# Patient Record
Sex: MALE | Race: WHITE | URBAN - METROPOLITAN AREA
[De-identification: names, ages, dates, MRNs, and addresses within clinical notes are randomized per-mention and may not be internally consistent; named-entity substitution may affect disease eponyms.]

---

## 2017-05-19 ENCOUNTER — EMERGENCY (EMERGENCY)
Facility: HOSPITAL | Age: 52
LOS: 0 days | Discharge: HOME | End: 2017-05-20
Attending: FAMILY MEDICINE | Admitting: FAMILY MEDICINE
Payer: COMMERCIAL

## 2017-05-19 VITALS
OXYGEN SATURATION: 95 % | DIASTOLIC BLOOD PRESSURE: 89 MMHG | WEIGHT: 190.04 LBS | HEIGHT: 70 IN | TEMPERATURE: 98 F | HEART RATE: 100 BPM | SYSTOLIC BLOOD PRESSURE: 127 MMHG | RESPIRATION RATE: 20 BRPM

## 2017-05-19 DIAGNOSIS — S49.82XA OTHER SPECIFIED INJURIES OF LEFT SHOULDER AND UPPER ARM, INITIAL ENCOUNTER: ICD-10-CM
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
## 2017-05-19 DIAGNOSIS — S44.22XA: ICD-10-CM

## 2017-05-19 DIAGNOSIS — S43.005A UNSPECIFIED DISLOCATION OF LEFT SHOULDER JOINT, INITIAL ENCOUNTER: ICD-10-CM
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
## 2017-05-19 PROCEDURE — 73030 X-RAY EXAM OF SHOULDER: CPT | Mod: 26,LT

## 2017-05-19 RX ORDER — ONDANSETRON 8 MG/1
8 TABLET, FILM COATED ORAL ONCE
Qty: 0 | Refills: 0 | Status: COMPLETED | OUTPATIENT
Start: 2017-05-19 | End: 2017-05-19

## 2017-05-19 RX ORDER — HYDROMORPHONE HYDROCHLORIDE 2 MG/ML
1 INJECTION INTRAMUSCULAR; INTRAVENOUS; SUBCUTANEOUS ONCE
Qty: 0 | Refills: 0 | Status: DISCONTINUED | OUTPATIENT
Start: 2017-05-19 | End: 2017-05-19

## 2017-05-19 RX ORDER — SODIUM CHLORIDE 9 MG/ML
3 INJECTION INTRAMUSCULAR; INTRAVENOUS; SUBCUTANEOUS EVERY 8 HOURS
Qty: 0 | Refills: 0 | Status: DISCONTINUED | OUTPATIENT
Start: 2017-05-19 | End: 2017-05-20

## 2017-05-19 RX ADMIN — ONDANSETRON 8 MILLIGRAM(S): 8 TABLET, FILM COATED ORAL at 23:53

## 2017-05-19 RX ADMIN — HYDROMORPHONE HYDROCHLORIDE 1 MILLIGRAM(S): 2 INJECTION INTRAMUSCULAR; INTRAVENOUS; SUBCUTANEOUS at 23:53

## 2017-05-19 NOTE — ED PROVIDER NOTE - OBJECTIVE STATEMENT
52 y/o male with no PMHx, was playing baseball today, dove for a ball, developed severe pain in left shoulder. He has had left shoulder ligament injuries in the past with surgery. +numbness in left arm and left hand.

## 2017-05-19 NOTE — ED PROVIDER NOTE - MEDICAL DECISION MAKING DETAILS
Pt with hx of rotator cuff repair with pins, with left shoulder dislocation when reaching for a ball during team game.  Pt has numbness and weakness at wrist which did not resolve after reduction by ortho.  Pt to follow up with his own orthopedist. Rest, ice.

## 2017-05-19 NOTE — ED PROVIDER NOTE - NS ED MD SCRIBE ATTENDING SCRIBE SECTIONS
DISPOSITION/REVIEW OF SYSTEMS/PHYSICAL EXAM/PROGRESS NOTE/PAST MEDICAL/SURGICAL/SOCIAL HISTORY/HISTORY OF PRESENT ILLNESS/RESULTS/HIV

## 2017-05-19 NOTE — ED PROVIDER NOTE - CONSTITUTIONAL, MLM
normal... Well appearing, well nourished, awake, alert, oriented to person, place, time/situation, uncomfortable appearing.

## 2017-05-19 NOTE — ED PROVIDER NOTE - CARE PLAN
Principal Discharge DX:	Dislocation of shoulder region, left, initial encounter  Secondary Diagnosis:	Injury of right radial nerve, unspecified injury location, initial encounter

## 2017-05-20 VITALS
RESPIRATION RATE: 18 BRPM | TEMPERATURE: 98 F | SYSTOLIC BLOOD PRESSURE: 122 MMHG | HEART RATE: 88 BPM | DIASTOLIC BLOOD PRESSURE: 81 MMHG | OXYGEN SATURATION: 99 %

## 2017-05-20 PROCEDURE — 99284 EMERGENCY DEPT VISIT MOD MDM: CPT

## 2017-05-20 PROCEDURE — 73030 X-RAY EXAM OF SHOULDER: CPT | Mod: 26,LT

## 2017-05-20 RX ORDER — SODIUM CHLORIDE 9 MG/ML
1000 INJECTION INTRAMUSCULAR; INTRAVENOUS; SUBCUTANEOUS ONCE
Qty: 0 | Refills: 0 | Status: COMPLETED | OUTPATIENT
Start: 2017-05-20 | End: 2017-05-20

## 2017-05-20 RX ORDER — HYDROMORPHONE HYDROCHLORIDE 2 MG/ML
1 INJECTION INTRAMUSCULAR; INTRAVENOUS; SUBCUTANEOUS ONCE
Qty: 0 | Refills: 0 | Status: DISCONTINUED | OUTPATIENT
Start: 2017-05-20 | End: 2017-05-20

## 2017-05-20 RX ADMIN — SODIUM CHLORIDE 1000 MILLILITER(S): 9 INJECTION INTRAMUSCULAR; INTRAVENOUS; SUBCUTANEOUS at 00:05

## 2017-05-20 RX ADMIN — HYDROMORPHONE HYDROCHLORIDE 1 MILLIGRAM(S): 2 INJECTION INTRAMUSCULAR; INTRAVENOUS; SUBCUTANEOUS at 00:04

## 2017-05-20 RX ADMIN — HYDROMORPHONE HYDROCHLORIDE 1 MILLIGRAM(S): 2 INJECTION INTRAMUSCULAR; INTRAVENOUS; SUBCUTANEOUS at 00:15

## 2017-05-20 NOTE — CONSULT NOTE ADULT - ASSESSMENT
51M with L glenohumeral dislocation & resultant radial N neuropraxia  -reduction maneuvers performed, imaging obtained/reviewed, proper alignment achieved  -pain control as per ED  -NWB LUE in sling until f/u with Dr Onofre  -maintain wrist splint for comfort, instructed pt to attempt to move fingers often and remove the brace periodically to attempt motion at the wrist  -residual radial nerve and possibly ulnar nerve neuropraxia noted, informed pt that this usually resolves over time  -pt instructed to f/u with Dr Onofre this week as outpt, call office for appt  -no further acute intervention indicated, ortho stable for d/c

## 2017-05-21 ENCOUNTER — EMERGENCY (EMERGENCY)
Facility: HOSPITAL | Age: 52
LOS: 0 days | Discharge: HOME | End: 2017-05-21
Attending: EMERGENCY MEDICINE | Admitting: EMERGENCY MEDICINE
Payer: COMMERCIAL

## 2017-05-21 VITALS — HEIGHT: 68 IN | WEIGHT: 190.04 LBS

## 2017-05-21 VITALS
HEART RATE: 78 BPM | SYSTOLIC BLOOD PRESSURE: 146 MMHG | OXYGEN SATURATION: 99 % | TEMPERATURE: 98 F | DIASTOLIC BLOOD PRESSURE: 94 MMHG | RESPIRATION RATE: 19 BRPM
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
## 2017-05-21 DIAGNOSIS — S44.12XS: ICD-10-CM

## 2017-05-21 DIAGNOSIS — R20.0 ANESTHESIA OF SKIN: ICD-10-CM

## 2017-05-21 PROCEDURE — 99285 EMERGENCY DEPT VISIT HI MDM: CPT

## 2017-05-21 PROCEDURE — 73221 MRI JOINT UPR EXTREM W/O DYE: CPT | Mod: 26,LT

## 2017-05-21 PROCEDURE — 73030 X-RAY EXAM OF SHOULDER: CPT | Mod: 26,LT

## 2017-05-21 NOTE — ED STATDOCS - DETAILS:
I, Domenico Cardenas,  performed the initial face to face bedside interview with this patient regarding history of present illness, review of symptoms and relevant past medical, social and family history.  I completed an independent physical examination.  I was the initial provider who evaluated this patient. I have signed out the follow up of any pending tests (i.e. labs, radiological studies) to the ACP.  I have communicated the patient’s plan of care and disposition with the ACP.  The history, relevant review of systems, past medical and surgical history, medical decision making, and physical examination was documented by the scribe in my presence and I attest to the accuracy of the documentation.

## 2017-05-21 NOTE — ED STATDOCS - NS ED MD SCRIBE ATTENDING SCRIBE SECTIONS
VITAL SIGNS( Pullset)/DISPOSITION/REVIEW OF SYSTEMS/RESULTS/PROGRESS NOTE/PAST MEDICAL/SURGICAL/SOCIAL HISTORY/PHYSICAL EXAM/HISTORY OF PRESENT ILLNESS

## 2017-05-21 NOTE — CONSULT NOTE ADULT - ASSESSMENT
51M with h/o L shoulder dislocation, returned to ED for concern over persistant neuropraxia  -Re-iterated to pt that neuropraxia s/p shoulder dislocation is a possible complication and that this usually resolves on its own over weeks to months. If the neuropraxia persists after several months, further testing may be required. However at this time there is no acute surgical orthopedic intervention for the neuropraxia. Pt is to continue wearing the splint/sling. And was again instructed to f/u with Dr Onofre in the office.  -pain control as per ED  - LUE in sling until f/u with Dr Onofre  -maintain wrist splint for comfort, instructed pt to attempt to move fingers often and remove the brace periodically to attempt motion at the wrist  -pt instructed to f/u with Dr Onofre this week as outpt, call office for appt  -no further acute intervention indicated, ortho stable for d/c

## 2017-05-21 NOTE — ED STATDOCS - ATTENDING CONTRIBUTION TO CARE
I, Domenico Cardenas,  performed the initial face to face bedside interview with this patient regarding history of present illness, review of symptoms and relevant past medical, social and family history.  I completed an independent physical examination.  I was the initial provider who evaluated this patient. I have signed out the follow up of any pending tests (i.e. labs, radiological studies) to theresident.  I have communicated the patient’s plan of care and disposition with the resibent  The history, relevant review of systems, past medical and surgical history, medical decision making, and physical examination was documented by the scribe in my presence and I attest to the accuracy of the documentation.

## 2017-05-21 NOTE — ED STATDOCS - OBJECTIVE STATEMENT
52 y/o M seen here yesterday for reduction of a dislocated shoulder with associated numbness presents to ED for evaluation of LUE numbness. Pt states the numbness yesterday has extended to his fingers and has worsened prompting him to return for further evaluation. Pt states he no longer feels sensation in his LUE. Pt denies weakness, numbness in any other areas, CP, SOB, n/v/d, incontinence.

## 2017-05-21 NOTE — ED STATDOCS - NEUROLOGICAL, MLM
Strength limited to medial nerve distribution distally, cap refill WNL, Sensation decreased in the LUE, Strength limited to medial nerve distribution distally, cap refill WNL, Sensation decreased severely in the LUE,

## 2017-05-21 NOTE — ED ADULT TRIAGE NOTE - CHIEF COMPLAINT QUOTE
c/o left arm numbness which has not resolved since friday, s/p dislocated left shoulder which was reduced on friday.

## 2017-05-21 NOTE — CONSULT NOTE ADULT - SUBJECTIVE AND OBJECTIVE BOX
51M presents to ED for continued weakness and paresthesia s/p glenohumeral dislocation on 5/20. Upon initial presentation back on Saturday morning, the pt had a L glenohumeral dislocation with radial nerve palsy, closed reduction was successful but did not improve his neurologic status. He continued to have a wrist drop and paresthesia. At that time it was explained to the pt that this neuropraxia, while uncommon, is a possible complication of a shoulder dislocation, and that it can take weeks and sometimes months to return to normal function. This was re-iterated to the pt today.    51yMale c/o L shoulder pain s/p diving for a ball while playing baseball. Patient denies any head trauma or loc. Pt admits to numbness/tingling of his fingers, as well as weakness. Patient denies any other injuries. Pt has h/o L rotator cuff repair several years ago.     PMH: Denies, recent L glenohumeral dislocation    PSH: Rotator cuff repair (Dr Anthony, several years ago)    All: NKDA    Meds: See med rec    Imaging L shoulder:  XR - no fx/dl  MRI - pending    PE LUE:  Skin intact, no erythema/ecchymosis  sensation intact C5 and C8-T1, diminished over C6/7 dermatomes  + wrist drop, unable to abd/adduct fingers, very minimal AROM of wrist/fingers d/t residual radial and possibly ulnar nerve neuropraxia  rad2+, brisk cap refill  compartments soft/nontender

## 2017-05-21 NOTE — ED STATDOCS - PROGRESS NOTE DETAILS
50 yo M w/ no pmhx w/ LUE paresthesias s/p shoulder dislocation yesterday here with worsening paresthesias and weakness. Patient had a softball injury w/ left shoulder dislocation and associated numbness yesterday which has worsened since yesterday, before was to wrist and now to fingers and now with weakness. Patient reports can barely lift his forearm. Patient is s/p reduction with orthopedics yesterday. On exam, left UE decreased sensation in the C5-C7 dermatomes, LUE elbow flexion 3/5, RUE 5/5. good ROM of left shoulder, shoulder flexion/extesion 5/5 b/l. pulses palpable, cap refill <2sec. Considering decreased strength and sensation, concern for nerve involvement, will get urgent MRI and ortho consult. - DL PGY4 seen by orthopedics. mri shows rotator cuff tear, spoke with orthopedics on call who reports can still go home and will be evaluated in office, the neuropraxia can last for weeks to months. Return instructions given.

## 2018-07-20 ENCOUNTER — INPATIENT (INPATIENT)
Facility: HOSPITAL | Age: 53
LOS: 0 days | Discharge: HOME | End: 2018-07-21
Attending: HOSPITALIST | Admitting: HOSPITALIST
Payer: COMMERCIAL

## 2018-07-20 VITALS — WEIGHT: 179.9 LBS

## 2018-07-20 LAB
ALBUMIN SERPL ELPH-MCNC: 3.8 G/DL — SIGNIFICANT CHANGE UP (ref 3.3–5)
ALP SERPL-CCNC: 63 U/L — SIGNIFICANT CHANGE UP (ref 40–120)
ALT FLD-CCNC: 27 U/L — SIGNIFICANT CHANGE UP (ref 12–78)
ANION GAP SERPL CALC-SCNC: 8 MMOL/L — SIGNIFICANT CHANGE UP (ref 5–17)
APTT BLD: 27.8 SEC — SIGNIFICANT CHANGE UP (ref 27.5–37.4)
AST SERPL-CCNC: 21 U/L — SIGNIFICANT CHANGE UP (ref 15–37)
BASOPHILS # BLD AUTO: 0.05 K/UL — SIGNIFICANT CHANGE UP (ref 0–0.2)
BASOPHILS NFR BLD AUTO: 0.9 % — SIGNIFICANT CHANGE UP (ref 0–2)
BILIRUB SERPL-MCNC: 0.4 MG/DL — SIGNIFICANT CHANGE UP (ref 0.2–1.2)
BUN SERPL-MCNC: 19 MG/DL — SIGNIFICANT CHANGE UP (ref 7–23)
CALCIUM SERPL-MCNC: 8.6 MG/DL — SIGNIFICANT CHANGE UP (ref 8.5–10.1)
CHLORIDE SERPL-SCNC: 107 MMOL/L — SIGNIFICANT CHANGE UP (ref 96–108)
CO2 SERPL-SCNC: 25 MMOL/L — SIGNIFICANT CHANGE UP (ref 22–31)
CREAT SERPL-MCNC: 1.09 MG/DL — SIGNIFICANT CHANGE UP (ref 0.5–1.3)
D DIMER BLD IA.RAPID-MCNC: <150 NG/ML DDU — SIGNIFICANT CHANGE UP
EOSINOPHIL # BLD AUTO: 0.26 K/UL — SIGNIFICANT CHANGE UP (ref 0–0.5)
EOSINOPHIL NFR BLD AUTO: 4.5 % — SIGNIFICANT CHANGE UP (ref 0–6)
GLUCOSE SERPL-MCNC: 92 MG/DL — SIGNIFICANT CHANGE UP (ref 70–99)
HCT VFR BLD CALC: 46.2 % — SIGNIFICANT CHANGE UP (ref 39–50)
HGB BLD-MCNC: 15.9 G/DL — SIGNIFICANT CHANGE UP (ref 13–17)
IMM GRANULOCYTES NFR BLD AUTO: 0.2 % — SIGNIFICANT CHANGE UP (ref 0–1.5)
INR BLD: 1.02 RATIO — SIGNIFICANT CHANGE UP (ref 0.88–1.16)
LYMPHOCYTES # BLD AUTO: 1.65 K/UL — SIGNIFICANT CHANGE UP (ref 1–3.3)
LYMPHOCYTES # BLD AUTO: 28.7 % — SIGNIFICANT CHANGE UP (ref 13–44)
MCHC RBC-ENTMCNC: 31.2 PG — SIGNIFICANT CHANGE UP (ref 27–34)
MCHC RBC-ENTMCNC: 34.4 GM/DL — SIGNIFICANT CHANGE UP (ref 32–36)
MCV RBC AUTO: 90.6 FL — SIGNIFICANT CHANGE UP (ref 80–100)
MONOCYTES # BLD AUTO: 0.65 K/UL — SIGNIFICANT CHANGE UP (ref 0–0.9)
MONOCYTES NFR BLD AUTO: 11.3 % — SIGNIFICANT CHANGE UP (ref 2–14)
NEUTROPHILS # BLD AUTO: 3.12 K/UL — SIGNIFICANT CHANGE UP (ref 1.8–7.4)
NEUTROPHILS NFR BLD AUTO: 54.4 % — SIGNIFICANT CHANGE UP (ref 43–77)
NRBC # BLD: 0 /100 WBCS — SIGNIFICANT CHANGE UP (ref 0–0)
PLATELET # BLD AUTO: 297 K/UL — SIGNIFICANT CHANGE UP (ref 150–400)
POTASSIUM SERPL-MCNC: 4.1 MMOL/L — SIGNIFICANT CHANGE UP (ref 3.5–5.3)
POTASSIUM SERPL-SCNC: 4.1 MMOL/L — SIGNIFICANT CHANGE UP (ref 3.5–5.3)
PROT SERPL-MCNC: 7.7 GM/DL — SIGNIFICANT CHANGE UP (ref 6–8.3)
PROTHROM AB SERPL-ACNC: 11 SEC — SIGNIFICANT CHANGE UP (ref 9.8–12.7)
RBC # BLD: 5.1 M/UL — SIGNIFICANT CHANGE UP (ref 4.2–5.8)
RBC # FLD: 12.3 % — SIGNIFICANT CHANGE UP (ref 10.3–14.5)
SODIUM SERPL-SCNC: 140 MMOL/L — SIGNIFICANT CHANGE UP (ref 135–145)
TROPONIN I SERPL-MCNC: <0.015 NG/ML — SIGNIFICANT CHANGE UP (ref 0.01–0.04)
TROPONIN I SERPL-MCNC: <0.015 NG/ML — SIGNIFICANT CHANGE UP (ref 0.01–0.04)
WBC # BLD: 5.74 K/UL — SIGNIFICANT CHANGE UP (ref 3.8–10.5)
WBC # FLD AUTO: 5.74 K/UL — SIGNIFICANT CHANGE UP (ref 3.8–10.5)

## 2018-07-20 PROCEDURE — 93010 ELECTROCARDIOGRAM REPORT: CPT

## 2018-07-20 PROCEDURE — 99285 EMERGENCY DEPT VISIT HI MDM: CPT

## 2018-07-20 PROCEDURE — 71046 X-RAY EXAM CHEST 2 VIEWS: CPT | Mod: 26

## 2018-07-20 RX ORDER — ACETAMINOPHEN 500 MG
650 TABLET ORAL ONCE
Qty: 0 | Refills: 0 | Status: COMPLETED | OUTPATIENT
Start: 2018-07-20 | End: 2018-07-20

## 2018-07-20 RX ORDER — ASPIRIN/CALCIUM CARB/MAGNESIUM 324 MG
325 TABLET ORAL ONCE
Qty: 0 | Refills: 0 | Status: COMPLETED | OUTPATIENT
Start: 2018-07-20 | End: 2018-07-20

## 2018-07-20 RX ADMIN — Medication 650 MILLIGRAM(S): at 19:33

## 2018-07-20 RX ADMIN — Medication 325 MILLIGRAM(S): at 19:33

## 2018-07-20 NOTE — ED STATDOCS - ATTENDING CONTRIBUTION TO CARE
I, Hamlet Luciano MD, personally saw the patient with ACP.  I have personally performed a face to face diagnostic evaluation on this patient.  I have reviewed the ACP note and agree with the history, exam, and plan of care, except as noted.

## 2018-07-20 NOTE — ED STATDOCS - OBJECTIVE STATEMENT
51 y/o male with no PMHx presents to the ED c/o chest pain that worsens upon inhalation. Pt states he was on vacation last week when he began experiencing chest pain. At the time, pt thought he pulled a muscle swimming. Pain resolved at that time. Yesterday, pt began to experience worsening CP exacerbated by deep breathing. At time of eval, pt states he only has CP with inhalation. Denies SOB. No other complaints at time of eval.

## 2018-07-20 NOTE — ED STATDOCS - MEDICAL DECISION MAKING DETAILS
51 y/o male with no PMHx presents to the ED c/o chest pain that worsens upon inhalation. Vitals are within normal limits. R/o ACS, PE, other life threatening causes of CP. 51 y/o male with no PMHx presents to the ED c/o chest pain that worsens upon inhalation. Vitals are within normal limits. R/o ACS, PE, other life threatening causes of CP.  Symptomatic treatment.  Reassess.

## 2018-07-20 NOTE — ED ADULT NURSE REASSESSMENT NOTE - NS ED NURSE REASSESS COMMENT FT1
pt received. alert and oriented. no signs of distress. pt noted to be bradycardic on the monitor; in the 40's. pt asymptomatic. on pacer pads. awaiting admission orders. will monitor closely.

## 2018-07-20 NOTE — ED STATDOCS - NS_ ATTENDINGSCRIBEDETAILS _ED_A_ED_FT
The scribe's documentation has been prepared under my direction and personally reviewed by me in its entirety.  I confirm that the note above accurately reflects all my work, treatment, procedures, and decision making except where otherwise noted or amended by me.  Hamlet Luciano M.D.

## 2018-07-20 NOTE — ED ADULT TRIAGE NOTE - CHIEF COMPLAINT QUOTE
pt presents to ED with complaints of right sided chest pain x 4 days pain with inspiration pt recently flew home on Monday approx 3 hour flight

## 2018-07-20 NOTE — ED STATDOCS - PROGRESS NOTE DETAILS
DORIS Diaz:   Patient has been seen, evaluated and orders have been written by the attending in intake. Patient is stable.  I will follow up the results of orders written and I will continue to evaluate/observe the patient.  Pt. with right sided CP with inspiration.  Two episodes.   [place] and one today.  Neg. LE pain.  Neg. SOB.  Neg. cough.   Father MI age 40.  Non smoker, on no medications.  Will obtain two troponins, re-evaluate.  Coretta Diaz PA-C Pt. bradycardic episode down to the 40s.  EKG ordered.  Coretta Diaz PA-C Admission complete.  Family requesting Dr. Glover.  Coretta Diaz PA-C

## 2018-07-21 ENCOUNTER — TRANSCRIPTION ENCOUNTER (OUTPATIENT)
Age: 53
End: 2018-07-21

## 2018-07-21 VITALS
SYSTOLIC BLOOD PRESSURE: 123 MMHG | TEMPERATURE: 98 F | RESPIRATION RATE: 16 BRPM | OXYGEN SATURATION: 95 % | HEART RATE: 60 BPM | DIASTOLIC BLOOD PRESSURE: 70 MMHG

## 2018-07-21 LAB
ANION GAP SERPL CALC-SCNC: 8 MMOL/L — SIGNIFICANT CHANGE UP (ref 5–17)
BUN SERPL-MCNC: 18 MG/DL — SIGNIFICANT CHANGE UP (ref 7–23)
CALCIUM SERPL-MCNC: 8 MG/DL — LOW (ref 8.5–10.1)
CHLORIDE SERPL-SCNC: 109 MMOL/L — HIGH (ref 96–108)
CHOLEST SERPL-MCNC: 188 MG/DL — SIGNIFICANT CHANGE UP (ref 10–199)
CO2 SERPL-SCNC: 25 MMOL/L — SIGNIFICANT CHANGE UP (ref 22–31)
CREAT SERPL-MCNC: 0.98 MG/DL — SIGNIFICANT CHANGE UP (ref 0.5–1.3)
GLUCOSE SERPL-MCNC: 89 MG/DL — SIGNIFICANT CHANGE UP (ref 70–99)
HDLC SERPL-MCNC: 41 MG/DL — SIGNIFICANT CHANGE UP (ref 40–125)
LIPID PNL WITH DIRECT LDL SERPL: 126 MG/DL — SIGNIFICANT CHANGE UP
POTASSIUM SERPL-MCNC: 3.9 MMOL/L — SIGNIFICANT CHANGE UP (ref 3.5–5.3)
POTASSIUM SERPL-SCNC: 3.9 MMOL/L — SIGNIFICANT CHANGE UP (ref 3.5–5.3)
SODIUM SERPL-SCNC: 142 MMOL/L — SIGNIFICANT CHANGE UP (ref 135–145)
TOTAL CHOLESTEROL/HDL RATIO MEASUREMENT: 4.6 RATIO — SIGNIFICANT CHANGE UP (ref 3.4–9.6)
TRIGL SERPL-MCNC: 106 MG/DL — SIGNIFICANT CHANGE UP (ref 10–149)
TROPONIN I SERPL-MCNC: <0.015 NG/ML — SIGNIFICANT CHANGE UP (ref 0.01–0.04)

## 2018-07-21 PROCEDURE — 93010 ELECTROCARDIOGRAM REPORT: CPT

## 2018-07-21 PROCEDURE — 71275 CT ANGIOGRAPHY CHEST: CPT | Mod: 26

## 2018-07-21 RX ORDER — SODIUM CHLORIDE 9 MG/ML
1000 INJECTION INTRAMUSCULAR; INTRAVENOUS; SUBCUTANEOUS
Qty: 0 | Refills: 0 | Status: DISCONTINUED | OUTPATIENT
Start: 2018-07-21 | End: 2018-07-21

## 2018-07-21 RX ORDER — ACETAMINOPHEN 500 MG
650 TABLET ORAL EVERY 6 HOURS
Qty: 0 | Refills: 0 | Status: DISCONTINUED | OUTPATIENT
Start: 2018-07-21 | End: 2018-07-21

## 2018-07-21 RX ORDER — ASPIRIN/CALCIUM CARB/MAGNESIUM 324 MG
81 TABLET ORAL DAILY
Qty: 0 | Refills: 0 | Status: DISCONTINUED | OUTPATIENT
Start: 2018-07-21 | End: 2018-07-21

## 2018-07-21 RX ORDER — ACETAMINOPHEN 500 MG
2 TABLET ORAL
Qty: 0 | Refills: 0 | DISCHARGE
Start: 2018-07-21

## 2018-07-21 RX ADMIN — Medication 81 MILLIGRAM(S): at 11:23

## 2018-07-21 NOTE — DISCHARGE NOTE ADULT - CARE PROVIDERS DIRECT ADDRESSES
,DirectAddress_Unknown,DirectAddress_Unknown,[email or web address]

## 2018-07-21 NOTE — DISCHARGE NOTE ADULT - NS MD DC FALL RISK RISK
sob-pneumonia- dm- htn-  chf  followup with   team- dw  er and   hospitalist and  pulmonary For information on Fall & Injury Prevention, visit [email or web address]

## 2018-07-21 NOTE — H&P ADULT - NSHPPHYSICALEXAM_GEN_ALL_CORE
Vital Signs Last 24 Hrs  T(C): 36.7 (21 Jul 2018 01:12), Max: 36.8 (20 Jul 2018 18:14)  T(F): 98 (21 Jul 2018 01:12), Max: 98.3 (20 Jul 2018 18:14)  HR: 61 (21 Jul 2018 01:12) (51 - 61)  BP: 119/71 (21 Jul 2018 01:12) (119/71 - 127/93)  BP(mean): --  RR: 16 (21 Jul 2018 01:12) (16 - 18)  SpO2: 98% (21 Jul 2018 01:12) (97% - 98%)    GEN: appears comfortable  Neuro: AAOx3, nonfocal  HEENT: NC/AT, EOMI  Neck: no thyroidmegaly, no JVD  Cardiovascular: S1S2 present, regular rhythm, no murmur  Respiratory: breath sounds normal bilaterally, no wheezing, no rales, no rhonchi  Gastrointestinal: bowel sounds normal, soft, no abdominal tenderness  Musculoskeletal: no muscle tenderness  Extremities: No edema  Skin: No rash Vital Signs Last 24 Hrs  T(C): 36.7 (21 Jul 2018 01:12), Max: 36.8 (20 Jul 2018 18:14)  T(F): 98 (21 Jul 2018 01:12), Max: 98.3 (20 Jul 2018 18:14)  HR: 61 (21 Jul 2018 01:12) (51 - 61)  BP: 119/71 (21 Jul 2018 01:12) (119/71 - 127/93)  BP(mean): --  RR: 16 (21 Jul 2018 01:12) (16 - 18)  SpO2: 98% (21 Jul 2018 01:12) (97% - 98%)    GEN: appears comfortable  Neuro: AAOx3, nonfocal  HEENT: NC/AT, EOMI  Neck: no thyroidmegaly, no JVD  Cardiovascular: S1S2 present, regular rhythm, no murmur, no tenderness  Respiratory: breath sounds normal bilaterally, no wheezing, no rales, no rhonchi  Gastrointestinal: bowel sounds normal, soft, no abdominal tenderness  Musculoskeletal: no muscle tenderness  Extremities: No edema  Skin: No rash

## 2018-07-21 NOTE — H&P ADULT - HISTORY OF PRESENT ILLNESS
52 y.o. male with PMH left glenohumeral dislocation presents with right sided chest pain. Pt reports 1 week ago while on vacation, he had 2 episodes of sharp right sided chest pain. Initially, he thought it was from swimming and strained a muscle. Today, he reports right sided chest pain, that's nonradiating, intermittent. Reports worse with deep breathing and improves at rest. Currently denies any pain. Denies fever, chills, SOB, abd pain, dysuria, diarrhea. Pt reports playing baseball today and felt the pain after wards. Denies straining his muscle. Pt plays baseball 2x/week during summer.    While in the ED, pt's heart rate dropped to 30s-40s. Pt denies any symptoms. denies lightheadedness or dizziness.     PMH: as above  PSH: rotator cuff repair  Social Hx: denies tobacco use; Occupation:   Family Hx: Father-MI age 40s,  age 80s  ROS: per HPI

## 2018-07-21 NOTE — CONSULT NOTE ADULT - ASSESSMENT
Middle age male, with chest pain, right sided, worse with inspiration, atypical for anginal pain  D Dimer negative  Opacification on the left lower lobe, will need pulmonary eval and possibly CT scan as outpatient  Also sleep apnea which will also need evaluation  Stable from cardiac point of view  D/C home after pulmonary evaluation  To follow as outpatient for ischemic work up.
PROBLEMS;    Atypical	chest pain-EKG: sinus rhythm-Asymptomatic bradycardia- unlikely PE  LLL atelectasis- unlikely pneumonia  Possible GERARD    PLAN:    pat pulmonary stable to fu in office on monday, no need of ABx, will do fu CXR in office for resolition of pneumonia  Sleep study as out pat  pulmonary ok to discharge  ambulate

## 2018-07-21 NOTE — CONSULT NOTE ADULT - SUBJECTIVE AND OBJECTIVE BOX
Patient is a 52y old  Male who presents with a chief complaint of chest pain (2018 03:39)      HPI:  52 y.o. male with PMH left glenohumeral dislocation presents with right sided chest pain. Pt reports 1 week ago while on vacation, he had 2 episodes of sharp right sided chest pain. Initially, he thought it was from swimming and strained a muscle. Today, he reports right sided chest pain, that's nonradiating, intermittent. Reports worse with deep breathing and improves at rest. Currently denies any pain. Denies fever, chills, SOB, abd pain, dysuria, diarrhea. Pt reports playing baseball today and felt the pain after wards. Denies straining his muscle. Pt plays baseball 2x/week during summer.  Very atypical chest pain probably non cardiac by discription  R/O for AMI  Opacification noted in left lower lobe????    PMH: as above  PSH: rotator cuff repair  Social Hx: denies tobacco use; Occupation:   Family Hx: Father-MI age 40s,  age 80s  ROS: per HPI (2018 03:39)      PAST MEDICAL & SURGICAL HISTORY:  No pertinent past medical history  No significant past surgical history      HPI:                PREVIOUS DIAGNOSTIC TESTING:      ECHO  FINDINGS:    STRESS  FINDINGS:    CATHETERIZATION  FINDINGS:    MEDICATIONS  (STANDING):  aspirin enteric coated 81 milliGRAM(s) Oral daily  sodium chloride 0.9%. 1000 milliLiter(s) (100 mL/Hr) IV Continuous <Continuous>    MEDICATIONS  (PRN):  acetaminophen   Tablet 650 milliGRAM(s) Oral every 6 hours PRN For Temp greater than 38 C (100.4 F), mild pain, HA      FAMILY HISTORY:  No pertinent family history in first degree relatives      SOCIAL HISTORY:    CIGARETTES:    ALCOHOL:    REVIEW OF SYSTEMS:  CONSTITUTIONAL:  No night sweats.  No fatigue, malaise, lethargy.  No fever or chills.  HEENT:  Eyes:  No visual changes.  No eye pain.  No eye discharge.    ENT:  No runny nose.  No epistaxis.  No sinus pain.  No sore throat.  No odynophagia.  No ear pain.  No congestion.  RESPIRATORY:  No cough.  No wheeze.  No hemoptysis.  No shortness of breath.  CARDIOVASCULAR:  No chest pains.  No palpitations. No shortness of breath, orthopnea or PND.  GASTROINTESTINAL:  No abdominal pain.  No nausea or vomiting.  No diarrhea or constipation.  No hematemesis.  No hematochezia.  No melena.  GENITOURINARY:  No urgency.  No frequency.  No dysuria.  No hematuria.  No obstructive symptoms.  No discharge.  No pain.  No significant abnormal bleeding.  MUSCULOSKELETAL:  No musculoskeletal pain.  No joint swelling.  No arthritis.  NEUROLOGICAL:  No tingling or numbness or weakness.  PSYCHIATRIC:  No confusion  SKIN:  No rashes.  No lesions.  No wounds.  ENDOCRINE:  No unexplained weight loss.  No polydipsia.  No polyuria.  No polyphagia.  HEMATOLOGIC:  No anemia.  No purpura.  No petechiae.  No prolonged or excessive bleeding.   ALLERGIC AND IMMUNOLOGIC:  No pruritus.  No swelling.         Vital Signs Last 24 Hrs  T(C): 36.8 (2018 05:11), Max: 36.8 (2018 18:14)  T(F): 98.2 (2018 05:11), Max: 98.3 (2018 18:14)  HR: 60 (2018 05:11) (51 - 82)  BP: 123/70 (2018 05:11) (119/71 - 127/93)  BP(mean): --  RR: 16 (2018 05:11) (16 - 18)  SpO2: 95% (2018 05:11) (95% - 98%)    PHYSICAL EXAM-    Constitutional: The patient appears to be normal, well developed, well nourished and alert and oriented to time, place and person. The patient does not appear acutely ill. The patient is alert.     Head: Head is normocephalic and atraumatic.      Neck: The patient's neck is supple without enlargement, has no palpable thyromegaly nor thyroid nodules and has no jugular venous distention. No audible carotid bruits. There are strong carotid pulses bilaterally. No JVD.     Cardiovascular: Regular rate and rhythm without S3, S4. No murmurs or rubs are appreciated.      Respiratory:  The patient has no rales and no rhonchi. The patient has no wheezes.     Abdomen: Soft, nontender, nondistended with positive bowel sounds.      Extremity: No tenderness. There is no pitting edema, skin discoloration, clubbing and cyanosis.           INTERPRETATION OF TELEMETRY:    ECG:    I&O's Detail      LABS:                        15.9   5.74  )-----------( 297      ( 2018 18:53 )             46.2     07-21    142  |  109<H>  |  18  ----------------------------<  89  3.9   |  25  |  0.98    Ca    8.0<L>      2018 06:58    TPro  7.7  /  Alb  3.8  /  TBili  0.4  /  DBili  x   /  AST  21  /  ALT  27  /  AlkPhos  63  07-20    CARDIAC MARKERS ( 2018 06:58 )  <0.015 ng/mL / x     / x     / x     / x      CARDIAC MARKERS ( 2018 22:21 )  <0.015 ng/mL / x     / x     / x     / x      CARDIAC MARKERS ( 2018 18:53 )  <0.015 ng/mL / x     / x     / x     / x          PT/INR - ( 2018 18:53 )   PT: 11.0 sec;   INR: 1.02 ratio         PTT - ( 2018 18:53 )  PTT:27.8 sec    I&O's Summary    BNP  RADIOLOGY & ADDITIONAL STUDIES:
HPI:  52 y.o.male PMH left glenohumeral dislocation admitted with right sided chest pain which started 1 week ago while on vacation, which is nonradiating & intermittent & worse with deep breathing and improves at rest. pat pain now resolved, lying comfortably. pat d-dimer 150 normal.Currently denies any pain. Denies fever, chills, SOB, abd pain, dysuria, diarrhea. pat also c/o snoring & apnieic episode. pat was told might have sleep apnea.    PMH: as above  PSH: rotator cuff repair  Social Hx: denies tobacco use; Occupation:   Family Hx: Father-MI age 40s,  age 80s  ROS: per HPI (2018 03:39)      PAST MEDICAL & SURGICAL HISTORY:  No pertinent past medical history  No significant past surgical history      Home Medications:  aspirin 81 mg oral tablet: 1 tab(s) orally once a day (2018 03:45)      MEDICATIONS  (STANDING):  aspirin enteric coated 81 milliGRAM(s) Oral daily  sodium chloride 0.9%. 1000 milliLiter(s) (100 mL/Hr) IV Continuous <Continuous>    MEDICATIONS  (PRN):  acetaminophen   Tablet 650 milliGRAM(s) Oral every 6 hours PRN For Temp greater than 38 C (100.4 F), mild pain, HA      Allergies    No Known Allergies    Intolerances        SOCIAL HISTORY: Denies tobacco, etoh abuse or illicit drug use    FAMILY HISTORY:  No pertinent family history in first degree relatives      Vital Signs Last 24 Hrs  T(C): 36.8 (2018 05:11), Max: 36.8 (2018 18:14)  T(F): 98.2 (2018 05:11), Max: 98.3 (2018 18:14)  HR: 60 (2018 05:11) (51 - 82)  BP: 123/70 (2018 05:11) (119/71 - 127/93)  BP(mean): --  RR: 16 (2018 05:11) (16 - 18)  SpO2: 95% (2018 05:11) (95% - 98%)        REVIEW OF SYSTEMS:    CONSTITUTIONAL:  As per HPI.  HEENT:  Eyes:  No diplopia or blurred vision. ENT:  No earache, sore throat or runny nose.  CARDIOVASCULAR:  No pressure, squeezing, tightness, heaviness or aching about the chest, neck, axilla or epigastrium.  RESPIRATORY:  No cough, shortness of breath, PND or orthopnea.  GASTROINTESTINAL:  No nausea, vomiting or diarrhea.  GENITOURINARY:  No dysuria, frequency or urgency.  MUSCULOSKELETAL:  As per HPI.  SKIN:  No change in skin, hair or nails.  NEUROLOGIC:  No paresthesias, fasciculations, seizures or weakness.  PSYCHIATRIC:  No disorder of thought or mood.  ENDOCRINE:  No heat or cold intolerance, polyuria or polydipsia.  HEMATOLOGICAL:  No easy bruising or bleedings:  .     PHYSICAL EXAMINATION:    GENERAL APPEARANCE:  Pt. is not currently dyspneic, in no distress. Pt. is alert, oriented, and pleasant.  HEENT:  Pupils are normal and react normally. No icterus. Mucous membranes well colored.  NECK:  Supple. No lymphadenopathy. Jugular venous pressure not elevated. Carotids equal.   HEART:   The cardiac impulse has a normal quality. Regular. Normal S1 and S2. There are no murmurs, rubs or gallops noted  CHEST:  Chest crackles to auscultation. Normal respiratory effort.  ABDOMEN:  Soft and nontender.   EXTREMITIES:  There is no cyanosis, clubbing or edema.   SKIN:  No rash or significant lesions are noted.    LABS:                        15.9   5.74  )-----------( 297      ( 2018 18:53 )             46.2     07-21    142  |  109<H>  |  18  ----------------------------<  89  3.9   |  25  |  0.98    Ca    8.0<L>      2018 06:58    TPro  7.7  /  Alb  3.8  /  TBili  0.4  /  DBili  x   /  AST  21  /  ALT  27  /  AlkPhos  63  07-20    LIVER FUNCTIONS - ( 2018 18:53 )  Alb: 3.8 g/dL / Pro: 7.7 gm/dL / ALK PHOS: 63 U/L / ALT: 27 U/L / AST: 21 U/L / GGT: x           PT/INR - ( 2018 18:53 )   PT: 11.0 sec;   INR: 1.02 ratio         PTT - ( 2018 18:53 )  PTT:27.8 sec  CARDIAC MARKERS ( 2018 06:58 )  <0.015 ng/mL / x     / x     / x     / x      CARDIAC MARKERS ( 2018 22:21 )  <0.015 ng/mL / x     / x     / x     / x      CARDIAC MARKERS ( 2018 18:53 )  <0.015 ng/mL / x     / x     / x     / x          RADIOLOGY & ADDITIONAL STUDIES:     Chest 2 Views PA/Lat (18 @ 21:09) >  Impression: Mild patchy airspace opacity the left lower lung concerning   for pneumonia.

## 2018-07-21 NOTE — DISCHARGE NOTE ADULT - OTHER SIGNIFICANT FINDINGS
< from: CT Angio Chest PE Protocol w/ IV Cont (07.21.18 @ 14:36) >  ULMONARY ARTERIES: Nopulmonary embolism to the proximal segmental   branches. Limited visualization of distal segmental and subsegmental   branches secondary to a combination of respiratory motion and suboptimal   bolus timing.    LUNGS, AIRWAYS: The central airways are patent. The lungs are clear. No   pneumonia.    PLEURA: No pleural effusion, hemothorax, or pneumothorax.    HEART AND VESSELS: Normal heart size. No pericardial effusion. Normal   caliber thoracic aorta.    MEDIASTINUM, YOHANA, AXILLAE: No adenopathy.    UPPER ABDOMEN: Left adrenal adenoma again noted versus a 2013 CT.    BONES AND CHEST WALL: No acute bony abnormality.    IMPRESSION:     No pulmonary embolism to the proximal segmental branches. Limited   visualization of distal segmental and subsegmental branches secondary to   a combination of respiratory motion and suboptimal bolus timing.    No pleural effusion, pneumothorax, or pneumonia.    < end of copied text >  < from: Xray Chest 2 Views PA/Lat (07.20.18 @ 21:09) >  on: Mild patchy airspace opacity the left lower lung concerning   for pneumonia.      < end of copied text >  Complete Blood Count + Automated Diff (07.20.18 @ 18:53)    WBC Count: 5.74 K/uL    RBC Count: 5.10 M/uL    Hemoglobin: 15.9 g/dL    Hematocrit: 46.2 %    Mean Cell Volume: 90.6 fl    Mean Cell Hemoglobin: 31.2 pg    Mean Cell Hemoglobin Conc: 34.4 gm/dL    Red Cell Distrib Width: 12.3 %    Platelet Count - Automated: 297 K/uL    Auto Neutrophil #: 3.12 K/uL    Auto Lymphocyte #: 1.65 K/uL    Auto Monocyte #: 0.65 K/uL    Auto Eosinophil #: 0.26 K/uL    Auto Basophil #: 0.05 K/uL    Auto Neutrophil %: 54.4: Differential percentages must be correlated with absolute numbers for  clinical significance. %    Auto Lymphocyte %: 28.7 %    Auto Monocyte %: 11.3 %    Auto Eosinophil %: 4.5 %    Auto Basophil %: 0.9 %    Auto Immature Granulocyte %: 0.2 %    Troponin I, Serum (07.21.18 @ 06:58)    Troponin I, Serum: <0.015: High Sensitivity Troponin and new reference  range effective 7/6/2016 ng/mL    Troponin I, Serum (07.20.18 @ 22:21)    Troponin I, Serum: <0.015: High Sensitivity Troponin and new reference  range effective 7/6/2016 ng/mL    Troponin I, Serum (07.20.18 @ 18:53)    Troponin I, Serum: <0.015: High Sensitivity Troponin and new reference  range effective 7/6/2016 ng/mL    Basic Metabolic Panel (07.21.18 @ 06:58)    Sodium, Serum: 142 mmol/L    Potassium, Serum: 3.9 mmol/L    Chloride, Serum: 109 mmol/L    Carbon Dioxide, Serum: 25 mmol/L    Anion Gap, Serum: 8 mmol/L    Blood Urea Nitrogen, Serum: 18 mg/dL    Creatinine, Serum: 0.98 mg/dL    Glucose, Serum: 89 mg/dL    Calcium, Total Serum: 8.0 mg/dL

## 2018-07-21 NOTE — DISCHARGE NOTE ADULT - PLAN OF CARE
prevent recurrence take tylenol as needed, Aspirin 81 daily, follow up with cardiologist and pulmonologist within 1 week

## 2018-07-21 NOTE — DISCHARGE NOTE ADULT - MEDICATION SUMMARY - MEDICATIONS TO TAKE
I will START or STAY ON the medications listed below when I get home from the hospital:    aspirin 81 mg oral tablet  -- 1 tab(s) by mouth once a day  -- Indication: For Chest pain    acetaminophen 325 mg oral tablet  -- 2 tab(s) by mouth every 6 hours, As needed, For Temp greater than 38 C (100.4 F), mild pain, HA  -- Indication: For Chest pain

## 2018-07-21 NOTE — H&P ADULT - ASSESSMENT
52 y.o. male with PMH left glenohumeral dislocation presents with right sided chest pain. Pt reports 1 week ago while on vacation, he had 2 episodes of sharp right sided chest pain. Initially, he thought it was from swimming and strained a muscle. Today, he reports right sided chest pain, that's nonradiating, intermittent. Reports worse with deep breathing and improves at rest. Currently denies any pain. Denies fever, chills, SOB, abd pain, dysuria, diarrhea. Pt reports playing baseball today and felt the pain after wards. Denies straining his muscle. Pt plays baseball 2x/week during summer.    While in the ED, pt's heart rate dropped to 30s-40s. Pt denies any symptoms. denies lightheadedness or dizziness.     #chest pain  -admit to tele  -serial EKG and cardiac enzymes  -EKG: sinus rhythm, HR 51, no STT changes  -ASA  -lipid panel  -possible in vs outpt stress test  -iv fluid    #asymptomatic bradycardia  -cardio consult, Dr Beckham    #DVT ppx  -SCDs, ambulation

## 2018-07-21 NOTE — DISCHARGE NOTE ADULT - CARE PLAN
Principal Discharge DX:	Chest pain  Goal:	prevent recurrence  Assessment and plan of treatment:	take tylenol as needed, Aspirin 81 daily, follow up with cardiologist and pulmonologist within 1 week

## 2018-07-21 NOTE — DISCHARGE NOTE ADULT - CARE PROVIDER_API CALL
Ray Danielson (DO), Family Medicine  [address] [facility]  [address]  [place] [address]  Phone: [phone]  Fax: [phone]    Donnell Dobbins), Critical Care Medicine; Internal Medicine; Pulmonary Disease; Sleep Medicine  [address] [place] [address]  Phone: [phone]  Fax: [phone]    Janet Beckham), Cardiovascular Disease; Interventional Cardiology  [address] [place] [address]  Phone: [phone]  Fax: [phone]

## 2018-07-21 NOTE — DISCHARGE NOTE ADULT - PATIENT PORTAL LINK FT
You can access the [facility] Patient Portal, offered by [facility], by registering with the following website: [email or web address]

## 2018-07-21 NOTE — DISCHARGE NOTE ADULT - HOSPITAL COURSE
Subjective:  Patient is a 52y old  Male who presents with a chief complaint of chest pain on 18     HPI:  52 y.o. male with PMH left glenohumeral dislocation presents with right sided chest pain. Pt reports 1 week ago while on vacation, he had 2 episodes of sharp right sided chest pain. Initially, he thought it was from swimming and strained a muscle. Today, he reports right sided chest pain, that's nonradiating, intermittent. Reports worse with deep breathing and improves at rest. Currently denies any pain. Denies fever, chills, SOB, abd pain, dysuria, diarrhea. Pt reports playing baseball today and felt the pain after wards. Denies straining his muscle. Pt plays baseball 2x/week during summer.    While in the ED, pt's heart rate dropped to 30s-40s. Pt denies any symptoms. denies lightheadedness or dizziness.   PMH: as above  PSH: rotator cuff repair  Social Hx: denies tobacco use; Occupation:   Family Hx: Father-MI age 40s,  age 80s     - pt seen and examined, denies CP, no tele events, cleared by cardiologist and pulmonologist for discharge, pt reports family h/o clots in sister and father , agreeing on CTA to r/o PE     Review of system- Rest of the review of system are negative except mentioned in HPI    OBJECTIVE:   T(C): 36.8 (18 @ 05:11), Max: 36.8 (18 @ 18:14)  HR: 60 (18 @ 05:11) (51 - 82)  BP: 123/70 (18 @ 05:11) (119/71 - 127/93)  RR: 16 (18 @ 05:11) (16 - 18)  SpO2: 95% (18 @ 05:11) (95% - 98%)  Wt(kg): --  Daily     Daily     PHYSICAL EXAM:  GENERAL: NAD  NERVOUS SYSTEM:  Alert & Oriented X3, non- focal exam, Motor Strength 5/5 B/L upper and lower extremities; DTRs 2+ intact and symmetric  HEAD:  Atraumatic, Normocephalic  EYES: EOMI, PERRLA, conjunctiva and sclera clear  HEENT: Moist mucous membranes  NECK: Supple, No JVD  CHEST/LUNG: Clear to auscultation bilaterally; No rales, no rhonchi, no wheezing, or rubs  HEART: Regular rate and rhythm; No murmurs, rubs, or gallops  ABDOMEN: Soft, Nontender, Nondistended; Bowel sounds present  GENITOURINARY- Voiding, no suprapubic tenderness  EXTREMITIES:  2+ Peripheral Pulses, No clubbing, cyanosis, or edema  MUSCULOSKELETAL:- No muscle tenderness, Muscle tone normal, No joint tenderness, no Joint swelling, Joint range of motion-normal  SKIN-no rash, no lesion    * Right sided chest pain, atypical , pleuritic   ruled out ACS, ruled out PE  CTA - no pulmonary pathology  c/w ASA   outpatient cardiac and pulmonary work-up  patient's pain resolved quickly     Disposition - medically optimized to be discharged home with close follow up with PCP, cardiology and pulmonology within 1 week  return to ED if fever, abdominal pain, nausea, vomiting, chest pain, dyspnea  Discharge plan discussed with patient, RN  Patient advised to follow up with PCP within 3-7 days  time spend 40 min  Discharge note faxed to PCP with my contact information to call me back

## 2018-07-26 DIAGNOSIS — R07.89 OTHER CHEST PAIN: ICD-10-CM

## 2018-07-26 DIAGNOSIS — Z79.82 LONG TERM (CURRENT) USE OF ASPIRIN: ICD-10-CM

## 2018-07-26 DIAGNOSIS — Z82.49 FAMILY HISTORY OF ISCHEMIC HEART DISEASE AND OTHER DISEASES OF THE CIRCULATORY SYSTEM: ICD-10-CM

## 2018-07-26 DIAGNOSIS — R07.9 CHEST PAIN, UNSPECIFIED: ICD-10-CM

## 2018-07-26 DIAGNOSIS — G47.30 SLEEP APNEA, UNSPECIFIED: ICD-10-CM

## 2018-07-26 DIAGNOSIS — J98.11 ATELECTASIS: ICD-10-CM

## 2020-09-08 ENCOUNTER — EMERGENCY (EMERGENCY)
Facility: HOSPITAL | Age: 55
LOS: 0 days | Discharge: HOME | End: 2020-09-08
Attending: EMERGENCY MEDICINE
Payer: COMMERCIAL

## 2020-09-08 VITALS
HEART RATE: 66 BPM | RESPIRATION RATE: 18 BRPM | SYSTOLIC BLOOD PRESSURE: 125 MMHG | TEMPERATURE: 98 F | DIASTOLIC BLOOD PRESSURE: 80 MMHG | OXYGEN SATURATION: 98 %

## 2020-09-08 VITALS — WEIGHT: 164.91 LBS | HEIGHT: 68 IN

## 2020-09-08 DIAGNOSIS — M79.605 PAIN IN LEFT LEG: ICD-10-CM

## 2020-09-08 DIAGNOSIS — M79.662 PAIN IN LEFT LOWER LEG: ICD-10-CM

## 2020-09-08 DIAGNOSIS — M79.89 OTHER SPECIFIED SOFT TISSUE DISORDERS: ICD-10-CM

## 2020-09-08 PROCEDURE — 99284 EMERGENCY DEPT VISIT MOD MDM: CPT | Mod: 25

## 2020-09-08 PROCEDURE — 93971 EXTREMITY STUDY: CPT | Mod: 26,LT

## 2020-09-08 PROCEDURE — 99283 EMERGENCY DEPT VISIT LOW MDM: CPT

## 2020-09-08 PROCEDURE — 93971 EXTREMITY STUDY: CPT | Mod: LT

## 2020-09-08 NOTE — ED STATDOCS - CARE PROVIDER_API CALL
Leopoldo Onofre E  ORTHOPAEDIC SURGERY  [address] [place] [address]  Phone: [phone]  Fax: [phone]  Follow Up Time:

## 2020-09-08 NOTE — ED STATDOCS - ATTENDING CONTRIBUTION TO CARE
I, Juancarlos Hernandez MD,  performed the initial face to face bedside interview with this patient regarding history of present illness, review of symptoms and relevant past medical, social and family history.  I completed an independent physical examination.  I was the initial provider who evaluated this patient. I have signed out the follow up of any pending tests (i.e. labs, radiological studies) to the ACP.  I have communicated the patient’s plan of care and disposition with the ACP.  The history, relevant review of systems, past medical and surgical history, medical decision making, and physical examination was documented by the scribe in my presence and I attest to the accuracy of the documentation.

## 2020-09-08 NOTE — ED STATDOCS - OBJECTIVE STATEMENT
53 y/o male with a PMHx of presents to the ED c/o left calf pain and swelling x3 days. Pt reports he is on his feet often. No trauma or injury. No hx of DVT or PE. No recent travel. No other complaints at this time. Ortho: Dr. Onofre.

## 2020-09-08 NOTE — ED STATDOCS - NSFOLLOWUPINSTRUCTIONS_ED_ALL_ED_FT
Muscle Strain  A muscle strain is an injury that occurs when a muscle is stretched beyond its normal length. Usually, a small number of muscle fibers are torn when this happens. There are three types of muscle strains. First-degree strains have the least amount of muscle fiber tearing and the least amount of pain. Second-degree and third-degree strains have more tearing and pain.  Usually, recovery from muscle strain takes 1–2 weeks. Complete healing normally takes 5–6 weeks.  What are the causes?  This condition is caused when a sudden, violent force is placed on a muscle and stretches it too far. This may occur with a fall, lifting, or sports.  What increases the risk?  This condition is more likely to develop in athletes and people who are physically active.  What are the signs or symptoms?  Symptoms of this condition include:  Pain.Bruising.Swelling.Trouble using the muscle.How is this diagnosed?  This condition is diagnosed based on a physical exam and your medical history. Tests may also be done, including an X-ray, ultrasound, or MRI.  How is this treated?  This condition is initially treated with PRICE therapy. This therapy involves:  Protecting the muscle from being injured again.Resting the injured muscle.Icing the injured muscle.Applying pressure (compression) to the injured muscle. This may be done with a splint or elastic bandage.Raising (elevating) the injured muscle.Your health care provider may also recommend medicine for pain.  Follow these instructions at home:  If you have a splint:     Wear the splint as told by your health care provider. Remove it only as told by your health care provider. Loosen the splint if your fingers or toes tingle, become numb, or turn cold and blue.Keep the splint clean.If the splint is not waterproof:  Do not let it get wet.Cover it with a watertight covering when you take a bath or a shower.Managing pain, stiffness, and swelling        If directed, put ice on the injured area.  If you have a removable splint, remove it as told by your health care provider.Put ice in a plastic bag.Place a towel between your skin and the bag.Leave the ice on for 20 minutes, 2–3 times a day.Move your fingers or toes often to avoid stiffness and to lessen swelling.Raise (elevate) the injured area above the level of your heart while you are sitting or lying down.Wear an elastic bandage as told by your health care provider. Make sure that it is not too tight.General instructions     Take over-the-counter and prescription medicines only as told by your health care provider.Restrict your activity and rest the injured muscle as told by your health care provider. Gentle movements may be allowed.If physical therapy was prescribed, do exercises as told by your health care provider.Do not put pressure on any part of the splint until it is fully hardened. This may take several hours.Do not use any products that contain nicotine or tobacco, such as cigarettes and e-cigarettes. These can delay bone healing. If you need help quitting, ask your health care provider.Ask your health care provider when it is safe to drive if you have a splint.Keep all follow-up visits as told by your health care provider. This is important.How is this prevented?  Warm up before exercising. This helps to prevent future muscle strains.Contact a health care provider if:  You have more pain or swelling in the injured area.Get help right away if:  You have numbness or tingling or lose a lot of strength in the injured area.Summary  A muscle strain is an injury that occurs when a muscle is stretched beyond its normal length.This condition is caused when a sudden, violent force is placed on a muscle and stretches it too far.This condition is initially treated with PRICE therapy, which involves protecting, resting, icing, compressing, and elevating.Gentle movements may be allowed. If physical therapy was prescribed, do exercises as told by your health care provider.This information is not intended to replace advice given to you by your health care provider. Make sure you discuss any questions you have with your health care provider.    FOLLOW UP WITH YOUR ORTHOPEDIC DOCTOR THIS WEEK, CALL THE OFFICE TO MAKE AN APPOINTMENT. RETURN TO ER FOR ANY WORSENING SYMPTOMS OR NEW CONCERNS. REPEAT THE ULTRASOUND IN 1 WEEK IF STILL HAVING SYMPTOMS.

## 2020-09-08 NOTE — ED ADULT NURSE NOTE - NSIMPLEMENTINTERV_GEN_ALL_ED
Implemented All Universal Safety Interventions:  [place] to call system. Call bell, personal items and telephone within reach. Instruct patient to call for assistance. Room bathroom lighting operational. Non-slip footwear when patient is off stretcher. Physically safe environment: no spills, clutter or unnecessary equipment. Stretcher in lowest position, wheels locked, appropriate side rails in place.

## 2020-09-08 NOTE — ED STATDOCS - MUSCULOSKELETAL, MLM
range of motion is not limited. 1+pedal edema left leg. TTP left medial aspect of calf. No palpable cord

## 2020-09-08 NOTE — ED STATDOCS - PROGRESS NOTE DETAILS
signed Jacquelyn Lizarraga PA-C Pt seen initially in intake by Dr. Hernandez   54M c/o atraumatic left medial proximal calf pain and mild swelling this morning, no known injury. SOno negative, results delayed since reports are not crossing over from PACS at the moment. Recommend pt f/u with his ortho abulencia this week. repeat sono in 1 week if symptoms persist. return precautions given. may treat as strain in the meantime. Pt feeling well at DC, agrees with DC and plan of care. signed Jacquelyn Lizarraga PA-C Pt seen initially in intake by Dr. Hernandez   54M c/o atraumatic left medial proximal calf pain and mild swelling this morning, no known injury. Sono negative, results delayed since reports are not crossing over from PACS at the moment. Recommend pt f/u with his ortho abulencia this week. repeat sono in 1 week if symptoms persist. return precautions given. may treat as strain in the meantime. Pt feeling well at DC, agrees with DC and plan of care.

## 2020-09-08 NOTE — ED STATDOCS - PATIENT PORTAL LINK FT
You can access the FollowMyHealth Patient Portal offered by [facility] by registering at the following website: [email or web address]. By joining [employer]’s FollowMyHealth portal, you will also be able to view your health information using other applications (apps) compatible with our system.

## 2020-09-08 NOTE — ED ADULT TRIAGE NOTE - CHIEF COMPLAINT QUOTE
Patient presents in ED with left calf pain and swelling x 3 days. Patient denies any recent travel. Denies SOB.

## 2020-11-09 ENCOUNTER — EMERGENCY (EMERGENCY)
Facility: HOSPITAL | Age: 55
LOS: 0 days | Discharge: HOME | End: 2020-11-09
Payer: COMMERCIAL

## 2020-11-09 VITALS
SYSTOLIC BLOOD PRESSURE: 144 MMHG | HEART RATE: 89 BPM | DIASTOLIC BLOOD PRESSURE: 92 MMHG | TEMPERATURE: 98 F | OXYGEN SATURATION: 99 % | RESPIRATION RATE: 18 BRPM | HEIGHT: 68 IN

## 2020-11-09 DIAGNOSIS — Z79.82 LONG TERM (CURRENT) USE OF ASPIRIN: ICD-10-CM

## 2020-11-09 DIAGNOSIS — Z20.828 CONTACT WITH AND (SUSPECTED) EXPOSURE TO OTHER VIRAL COMMUNICABLE DISEASES: ICD-10-CM

## 2020-11-09 LAB — SARS-COV-2 RNA SPEC QL NAA+PROBE: SIGNIFICANT CHANGE UP

## 2020-11-09 PROCEDURE — 99283 EMERGENCY DEPT VISIT LOW MDM: CPT

## 2020-11-09 PROCEDURE — U0003: CPT

## 2020-11-09 PROCEDURE — 99283 EMERGENCY DEPT VISIT LOW MDM: CPT | Mod: CR

## 2020-11-09 NOTE — ED ADULT TRIAGE NOTE - CHIEF COMPLAINT QUOTE
PATIENT COMES IN FOR COVID TESTING. PATIENT DENIES SYMPTOMS/EXPOSURE. +TRAVEL. PATIENT SEEN AND D/NARDA BY PA. SEE PA'S NOTE.

## 2020-11-09 NOTE — ED STATDOCS - NSFOLLOWUPINSTRUCTIONS_ED_ALL_ED_FT
Pt here for COVID-19 testing. Pt non toxic. Pt was swabbed for COVID-19 in their car in drive through area. [facility] will call pt with results. return precautions given. Home self-quarantine recommended. Pt agrees with plan of  care.

## 2020-11-09 NOTE — ED STATDOCS - PATIENT PORTAL LINK FT
You can access the FollowMyHealth Patient Portal offered by [facility] by registering at the following website: [email or web address]. By joining [employer]’s FollowMyHealth portal, you will also be able to view your health information using other applications (apps) compatible with our system.

## 2020-11-09 NOTE — ED STATDOCS - OBJECTIVE STATEMENT
55M here for COVID 19 swab since pt recently got back from travelling out of state. Pt denies symptoms.

## 2021-04-06 NOTE — ED ADULT TRIAGE NOTE - NS ED NURSE DIRECT TO ROOM YN
[FreeTextEntry1] : 8 years old with cough and runny nose\par will send her back to school once covid test is neg\par to take allergy medicine,claritin or zyrtec to help her symptoms Yes

## 2021-04-23 NOTE — PATIENT PROFILE ADULT. - PAIN SCALE PREFERRED, PROFILE
Patient's mother called re: patient's bp was extremely low 77/38. Mom just wanted to adjust patient's bp medications but I advised her to get her to an ER as soon as possible, that is a dangerously low bp & needs to be dealt with immediately. Mother expresses understanding.    numerical 0-10

## 2021-07-03 ENCOUNTER — EMERGENCY (EMERGENCY)
Facility: HOSPITAL | Age: 56
LOS: 0 days | Discharge: HOME | End: 2021-07-03
Attending: EMERGENCY MEDICINE
Payer: COMMERCIAL

## 2021-07-03 VITALS
RESPIRATION RATE: 16 BRPM | DIASTOLIC BLOOD PRESSURE: 88 MMHG | TEMPERATURE: 98 F | HEART RATE: 84 BPM | OXYGEN SATURATION: 99 % | SYSTOLIC BLOOD PRESSURE: 134 MMHG

## 2021-07-03 VITALS — WEIGHT: 179.9 LBS | HEIGHT: 68 IN

## 2021-07-03 DIAGNOSIS — Z20.822 CONTACT WITH AND (SUSPECTED) EXPOSURE TO COVID-19: ICD-10-CM

## 2021-07-03 DIAGNOSIS — Z79.82 LONG TERM (CURRENT) USE OF ASPIRIN: ICD-10-CM

## 2021-07-03 DIAGNOSIS — K52.9 NONINFECTIVE GASTROENTERITIS AND COLITIS, UNSPECIFIED: ICD-10-CM

## 2021-07-03 DIAGNOSIS — R19.7 DIARRHEA, UNSPECIFIED: ICD-10-CM

## 2021-07-03 DIAGNOSIS — R53.1 WEAKNESS: ICD-10-CM

## 2021-07-03 LAB
ALBUMIN SERPL ELPH-MCNC: 3.4 G/DL — SIGNIFICANT CHANGE UP (ref 3.3–5)
ALP SERPL-CCNC: 73 U/L — SIGNIFICANT CHANGE UP (ref 40–120)
ALT FLD-CCNC: 24 U/L — SIGNIFICANT CHANGE UP (ref 12–78)
ANION GAP SERPL CALC-SCNC: 4 MMOL/L — LOW (ref 5–17)
AST SERPL-CCNC: 12 U/L — LOW (ref 15–37)
BASOPHILS # BLD AUTO: 0 K/UL — SIGNIFICANT CHANGE UP (ref 0–0.2)
BASOPHILS NFR BLD AUTO: 0 % — SIGNIFICANT CHANGE UP (ref 0–2)
BILIRUB SERPL-MCNC: 0.4 MG/DL — SIGNIFICANT CHANGE UP (ref 0.2–1.2)
BUN SERPL-MCNC: 17 MG/DL — SIGNIFICANT CHANGE UP (ref 7–23)
CALCIUM SERPL-MCNC: 8.8 MG/DL — SIGNIFICANT CHANGE UP (ref 8.5–10.1)
CHLORIDE SERPL-SCNC: 106 MMOL/L — SIGNIFICANT CHANGE UP (ref 96–108)
CO2 SERPL-SCNC: 29 MMOL/L — SIGNIFICANT CHANGE UP (ref 22–31)
CREAT SERPL-MCNC: 1.23 MG/DL — SIGNIFICANT CHANGE UP (ref 0.5–1.3)
EOSINOPHIL # BLD AUTO: 0.16 K/UL — SIGNIFICANT CHANGE UP (ref 0–0.5)
EOSINOPHIL NFR BLD AUTO: 3 % — SIGNIFICANT CHANGE UP (ref 0–6)
GLUCOSE SERPL-MCNC: 96 MG/DL — SIGNIFICANT CHANGE UP (ref 70–99)
HCT VFR BLD CALC: 45.5 % — SIGNIFICANT CHANGE UP (ref 39–50)
HGB BLD-MCNC: 15.5 G/DL — SIGNIFICANT CHANGE UP (ref 13–17)
LYMPHOCYTES # BLD AUTO: 1.54 K/UL — SIGNIFICANT CHANGE UP (ref 1–3.3)
LYMPHOCYTES # BLD AUTO: 29 % — SIGNIFICANT CHANGE UP (ref 13–44)
MCHC RBC-ENTMCNC: 30.4 PG — SIGNIFICANT CHANGE UP (ref 27–34)
MCHC RBC-ENTMCNC: 34.1 GM/DL — SIGNIFICANT CHANGE UP (ref 32–36)
MCV RBC AUTO: 89.2 FL — SIGNIFICANT CHANGE UP (ref 80–100)
MONOCYTES # BLD AUTO: 0.58 K/UL — SIGNIFICANT CHANGE UP (ref 0–0.9)
MONOCYTES NFR BLD AUTO: 11 % — SIGNIFICANT CHANGE UP (ref 2–14)
NEUTROPHILS # BLD AUTO: 2.76 K/UL — SIGNIFICANT CHANGE UP (ref 1.8–7.4)
NEUTROPHILS NFR BLD AUTO: 41 % — LOW (ref 43–77)
NRBC # BLD: SIGNIFICANT CHANGE UP /100 WBCS (ref 0–0)
PLATELET # BLD AUTO: 451 K/UL — HIGH (ref 150–400)
POTASSIUM SERPL-MCNC: 4.2 MMOL/L — SIGNIFICANT CHANGE UP (ref 3.5–5.3)
POTASSIUM SERPL-SCNC: 4.2 MMOL/L — SIGNIFICANT CHANGE UP (ref 3.5–5.3)
PROT SERPL-MCNC: 7.6 GM/DL — SIGNIFICANT CHANGE UP (ref 6–8.3)
RBC # BLD: 5.1 M/UL — SIGNIFICANT CHANGE UP (ref 4.2–5.8)
RBC # FLD: 12.4 % — SIGNIFICANT CHANGE UP (ref 10.3–14.5)
SODIUM SERPL-SCNC: 139 MMOL/L — SIGNIFICANT CHANGE UP (ref 135–145)
WBC # BLD: 5.31 K/UL — SIGNIFICANT CHANGE UP (ref 3.8–10.5)
WBC # FLD AUTO: 5.31 K/UL — SIGNIFICANT CHANGE UP (ref 3.8–10.5)

## 2021-07-03 PROCEDURE — 85025 COMPLETE CBC W/AUTO DIFF WBC: CPT

## 2021-07-03 PROCEDURE — U0003: CPT

## 2021-07-03 PROCEDURE — U0005: CPT

## 2021-07-03 PROCEDURE — 87493 C DIFF AMPLIFIED PROBE: CPT

## 2021-07-03 PROCEDURE — 80053 COMPREHEN METABOLIC PANEL: CPT

## 2021-07-03 PROCEDURE — 74177 CT ABD & PELVIS W/CONTRAST: CPT

## 2021-07-03 PROCEDURE — 99285 EMERGENCY DEPT VISIT HI MDM: CPT

## 2021-07-03 PROCEDURE — 74177 CT ABD & PELVIS W/CONTRAST: CPT | Mod: 26,MA

## 2021-07-03 PROCEDURE — 36415 COLL VENOUS BLD VENIPUNCTURE: CPT

## 2021-07-03 PROCEDURE — 99284 EMERGENCY DEPT VISIT MOD MDM: CPT | Mod: 25

## 2021-07-03 RX ORDER — SODIUM CHLORIDE 9 MG/ML
1000 INJECTION INTRAMUSCULAR; INTRAVENOUS; SUBCUTANEOUS ONCE
Refills: 0 | Status: COMPLETED | OUTPATIENT
Start: 2021-07-03 | End: 2021-07-03

## 2021-07-03 RX ORDER — VANCOMYCIN HCL 1 G
1 VIAL (EA) INTRAVENOUS
Qty: 40 | Refills: 0
Start: 2021-07-03 | End: 2021-07-12

## 2021-07-03 RX ORDER — VANCOMYCIN HCL 1 G
125 VIAL (EA) INTRAVENOUS ONCE
Refills: 0 | Status: COMPLETED | OUTPATIENT
Start: 2021-07-03 | End: 2021-07-03

## 2021-07-03 RX ADMIN — Medication 125 MILLIGRAM(S): at 22:19

## 2021-07-03 RX ADMIN — SODIUM CHLORIDE 1000 MILLILITER(S): 9 INJECTION INTRAMUSCULAR; INTRAVENOUS; SUBCUTANEOUS at 18:58

## 2021-07-03 NOTE — ED ADULT NURSE NOTE - CAS ELECT INFOMATION PROVIDED
IV removed from patient at time of discharge. Patient agreed to all verbal and written discharge instructions, patient in stable medical condition. Patient agreed to follow up with PCP/PMD. Patient education preformed on signs/symptoms to return to the ED. Patient is alert, orientented, and ambulatory at time of discharge/DC instructions

## 2021-07-03 NOTE — ED ADULT TRIAGE NOTE - CHIEF COMPLAINT QUOTE
pt presents to ED due to complaints of diarrhea since 6/23 when he completed a coarse of antibiotics after a prostate procedure on 6/14 pt states he notified the MD

## 2021-07-03 NOTE — ED PROVIDER NOTE - OBJECTIVE STATEMENT
56 y/o M with no significant PMHx presents to the ED c/o diarrhea. States he had prostate biopsy on June 14th, when he was put on Levaquin and Cefuroxime. Reports 2 days after procedure, diarrhea started. +weakness . Able to eat and drink. No abd pain. No fevers.  PCP: Dr. Rodriguez in [employer]

## 2021-07-03 NOTE — ED PROVIDER NOTE - PROGRESS NOTE DETAILS
d/w pt and wife at bedside results of ct and labs. ct c/w colitis, likely cdiff given hpi. will tx with vancomycin orally. pt tolerating po. normal vitals and benign abd exam. pt to follow up with pmd dr. Danielson. MD AUDREY

## 2021-07-03 NOTE — ED PROVIDER NOTE - CARE PROVIDER_API CALL
Ray Danielson  FAMILY MEDICINE  [address] [place] [address], [place] [address]  Phone: [phone]  Fax: [phone]  Follow Up Time:

## 2021-07-03 NOTE — ED PROVIDER NOTE - NSFOLLOWUPINSTRUCTIONS_ED_ALL_ED_FT
Log Out.      [employer] Care[employer]®     :  [facility]  	                       C. DIFF (CLOSTRIDIOIDES DIFFICILE) INFECTION - AfterCare(R) Instructions(ER/ED)           C. Diff (Clostridioides Difficile) Infection    WHAT YOU NEED TO KNOW:    Clostridioides difficile, Clostridium difficile, or C. diff, is a bacterium that causes diarrhea, irritation, and swelling of the colon. Antibiotic use is the most common cause of CDI. The bowel movement of a person with a CDI contains C. diff. Infected people who do not wash their hands properly after having a bowel movement can spread C. diff. The bacteria can live a long time on surfaces you touch, such as the tops of tables.    DISCHARGE INSTRUCTIONS:    Call your local emergency number (911 in the [place]) if:   •You have a fever and stomach cramps that get worse, or do not go away.      •Your abdomen is hard or feels swollen.      •You have black or bright red bowel movements.      •You vomit blood.      •You are short of breath, or feel like you are going to faint.      •You have any of the following signs of dehydration:?Dizziness or weakness, or extreme sleepiness      ?Dry mouth, cracked lips, or you feel very thirsty      ?Fast heartbeat or rapid breathing      ?Very little urine or no urine      ?Sunken eyes         Call your doctor if:   •You have a fever.      •Your signs and symptoms get worse.      •Your signs and symptoms do not go away, or they come back, even after treatment.      •You cannot eat or drink.      •You have questions or concerns about your condition or care.      Medicines:   •Antibiotics may be given to keep the C. diff bacteria from growing.      •Take your medicine as directed. Contact your healthcare provider if you think your medicine is not helping or if you have side effects. Tell him or her if you are allergic to any medicine. Keep a list of the medicines, vitamins, and herbs you take. Include the amounts, and when and why you take them. Bring the list or the pill bottles to follow-up visits. Carry your medicine list with you in case of an emergency.      What you can do to manage or prevent a CDI:   •Wash your hands often. Wash your hands several times each day. Wash after you use the bathroom, change a child's diaper, and before you prepare or eat food. Use soap and water every time. Rub your soapy hands together, lacing your fingers. Wash the front and back of your hands, and in between your fingers. Use the fingers of one hand to scrub under the fingernails of the other hand. Wash for at least 20 seconds. Rinse with warm, running water for several seconds. Then dry your hands with a clean towel or paper towel. Use hand  that contains alcohol if soap and water are not available. Do not touch your eyes, nose, or mouth without washing your hands first.  Handwashing           •Clean surfaces often. Clean doorknobs, countertops, cell phones, and other surfaces that are touched often. Use a disinfecting wipe, a single-use sponge, or a cloth you can wash and reuse. Use disinfecting  if you do not have wipes. You can create a disinfecting  by mixing 1 part bleach with 10 parts water.      •Prevent the spread of C. diff. Do not share any items with other people. Use as many disposable items as you can, such as paper plates. Do this until your diarrhea has stopped.      •Ask about probiotics. Probiotics are also called good bacteria. They can help protect you from harmful bacteria. If you develop more than one CDI, probiotics may help prevent more infections. Ask your healthcare provider if probiotics are right for you. You may be able to eat yogurt or other foods high in probiotics. Your provider may instead recommend a pill or liquid form.      •Drink more liquids to prevent dehydration. You may also drink an oral rehydration solution (ORS). An ORS has the right amounts of water, salts, and sugar needed to replace body fluids. Ask your healthcare provider where to buy ORS and how much to drink.      What you need to know about correct antibiotic use:   •Take your antibiotic as directed. Do not skip a dose of your antibiotic. Do not stop taking your antibiotic, even if you feel better. Finish the entire dose of your antibiotic unless your healthcare provider tells you to stop.      •Get rid of any antibiotics you did not use. Ask your healthcare provider or pharmacist how to get rid of antibiotics. Do not share your antibiotic with another person. Do not take an antibiotic from another illness without talking to your healthcare provider.      •Prevent infections caused by bacteria. This will help prevent your need for an antibiotic. Ask about vaccines that you need. Wash your hands frequently to prevent the spread of infection.      •Ask your healthcare provider how to manage your symptoms without antibiotics. Your healthcare provider can recommend other treatments based on your illness. An example includes over-the-counter medicines such as acetaminophen or NSAIDs.      Follow up with your doctor in 1 to 2 days: You may need to have an antibiotic changed if it caused your CDI. Write down your questions so you remember to ask them during your visits.       © Copyright [employer] 2021           back to top                          © Copyright [employer] 2021

## 2021-07-03 NOTE — ED PROVIDER NOTE - PATIENT PORTAL LINK FT
You can access the FollowMyHealth Patient Portal offered by [facility] by registering at the following website: [email or web address]. By joining [employer]’s FollowMyHealth portal, you will also be able to view your health information using other applications (apps) compatible with our system.

## 2021-07-03 NOTE — ED PROVIDER NOTE - CLINICAL SUMMARY MEDICAL DECISION MAKING FREE TEXT BOX
Plan: suspect cdiff, will do labs, send stool sample, CT abd and pelvis, give IV fluids, and reevaluate.

## 2021-07-03 NOTE — ED ADULT NURSE NOTE - OBJECTIVE STATEMENT
Pt. to the ED BIB Spouse C/O Diarrhea x 1 Week- Pt. states he had Prostate procedure a couple weeks ago and was placed on several PO antibiotics. Pt. denies Fevers, Nausea and Vomiting/bleeding- Pt. also denies Abdominal Pain- IV and Labs as ordered. Will cont to monitor patient closely- safety maintained

## 2021-07-05 LAB
C DIFF BY PCR RESULT: SIGNIFICANT CHANGE UP
C DIFF TOX GENS STL QL NAA+PROBE: SIGNIFICANT CHANGE UP

## 2021-08-28 NOTE — PATIENT PROFILE ADULT. - VISION (WITH CORRECTIVE LENSES IF THE PATIENT USUALLY WEARS THEM):
Normal vision: sees adequately in most situations; can see medication labels, newsprint/reading glasses DISCHARGE

## 2021-11-03 NOTE — ED ADULT NURSE NOTE - NS ED NURSE DC INFO COMPLEXITY
(1) a few with stimulation Verbalized Understanding/Simple: Patient demonstrates quick and easy understanding

## 2022-07-12 NOTE — PATIENT PROFILE ADULT. - PRESSURE ULCER(S)
[facility]                                     Progress Note     Patient Name: Jeanna Steel  Date: 7/12/2022          Service Type: Individual     Session Start Time:  11:00  Session End Time: 11:45     Session Length: 45 min    Session #: 64    There has been demonstrated improvement in functioning while patient has been engaged in psychotherapy/psychological service- if withdrawn the patient would deteriorate and/or relapse.     Attendees: Client attended alone      Session Type: Telemedicine Visit: The patient's condition can be safely assessed and treated via synchronous audio and visual telemedicine encounter.      Reason for Telemedicine Visit: Services only offered telehealth    Originating Site (Patient Location): Patient's home    Distant Site (Provider Location): Provider Remote Setting- Home Office    Consent:  The patient/guardian has verbally consented to: the potential risks and benefits of telemedicine (video visit) versus in person care; bill my insurance or make self-payment for services provided; and responsibility for payment of non-covered services.     Mode of Communication:  Video Conference via [employer]    As the provider I attest to compliance with applicable laws and regulations related to telemedicine.       DATA  Interactive Complexity: No  Crisis: No       Progress Since Last Session (Related to Symptoms / Goals / Homework):   Symptoms: Worsening increased stress    Homework: Achieved / completed to satisfaction client reported communicating effectively       Episode of Care Goals: Satisfactory progress - ACTION (Actively working towards change); Intervened by reinforcing change plan / affirming steps taken     Current / Ongoing Stressors and Concerns:   Ongoing: Client reports continued symptoms of depression and anxiety while caretaking for her two parents, wanting to manage alcohol use.    Current: Client reported she has had a stressful few weeks,  particularly with her parents. She described how her dad had a stroke, impacting his mobility and causing her mom to decline further. Client said she and her brothers are thinking their current facility isn't able to manage their complicated dynamics and they are going to start exploring other options. She explained how she hasn't been able to have balance with other areas of her life.     Treatment Objective(s) Addressed in This Session:   Identify negative self-talk and behaviors: challenge core beliefs, myths, and actions  Improve concentration, focus, and mindfulness in daily activities        Intervention:   DBT: reviewed client's stress management, how she is able to focus on what is in her control. Identified use of effective communication. Discussed how client can restart some good habits that fell off during stress.   Motivational Interviewing    MI Intervention: Expressed Empathy/Understanding, Supported Autonomy, Collaboration, Evocation, Permission to raise concern or advise, Open-ended questions, Reflections: simple and complex, Change talk (evoked) and Reframe     Change Talk Expressed by the Patient: Desire to change Ability to change Reasons to change Need to change Committment to change Activation Taking steps    Provider Response to Change Talk: E - Evoked more info from patient about behavior change, A - Affirmed patient's thoughts, decisions, or attempts at behavior change, R - Reflected patient's change talk and S - Summarized patient's change talk statements      Assessments completed prior to visit:  The following assessments were completed by patient for this visit:  GAD7:   JESSICA-7 SCORE 1/6/2021 6/28/2021 9/16/2021 9/16/2021 10/29/2021 3/8/2022 5/12/2022   Total Score - - - - - - -   Total Score - 2 (minimal anxiety) - 1 (minimal anxiety) 0 (minimal anxiety) - 2 (minimal anxiety)   Total Score 3 2 1 1 0 3 2         ASSESSMENT: Current Emotional / Mental Status (status of significant  symptoms):   Risk status (Self / Other harm or suicidal ideation)   Client denies current fears or concerns for personal safety.   Client denies current or recent suicidal ideation or behaviors.   Client denies current or recent homicidal ideation or behaviors.   Client denies current or recent self injurious behavior or ideation.   Client denies other safety concerns.   Client reports there has been a change in risk factors since their last session.  stress   Client reports there has been no change in protective factors since their last session.     Recommended that patient call 911 or go to the local ED should there be a change in any of these risk factors.     Appearance:   Appropriate    Eye Contact:   Good    Psychomotor Behavior: Normal     Attitude:   Cooperative  Pleasant Attentive   Orientation:   All   Speech    Rate / Production: Normal/ Responsive     Volume:  Normal    Mood:    Anxious   Client presented as anxious   Affect:    Appropriate  Tearful   Thought Content:  Clear    Thought Form:  Coherent  Goal Directed  Logical    Insight:    Good      Medication Review:   No changes to current psychiatric medication(s)     Medication Compliance:   Yes     Changes in Health Issues:   None reported     Chemical Use Review:   Substance Use: Problem use continues with no change since last session, Stage of Change: Action  Provided encouragement towards sobriety  Provided support and affirmation for steps taken towards sobriety          Tobacco Use: No change in amount of tobacco use since last session.  Contemplation  Provided encouragement to quit     Diagnosis:  1. Generalized anxiety disorder        Collateral Reports Completed:   Not Applicable    PLAN: (Client Tasks / Therapist Tasks / Other)  Client will continue upholding her boundaries, self-validate and return for therapy in August.         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  PROMIS (reviewed every 90 days):     Referral / Collaboration:  The following referral(s) will be initiated: community support with alcohol reduction.    Anticipated number of session for this episode of care: 100-150  Anticipation frequency of session: Every other week  Anticipated Duration of each session: 38-52 minutes  Treatment plan will be reviewed in 90 days or when goals have been changed.       MeasurableTreatment Goal(s) related to diagnosis / functional impairment(s)  Goal 1: Client will continue reduce alcohol use to 4-5 days a week alcohol free in the next three months and client reporting use of three new coping skills to manage stress in the evenings.    I will know I've met my goal when I'm only drinking two drinks and going to bed earlier.      Objective #A (Client Action)    Client will identify at least three consequences of maladaptive drinking.  Status: Continued - Date(s):  4/27/2022    Intervention(s)  Therapist will assign homework to identify impact of drinking  provide support.    Objective #B  Client will identify three coping skills to replace drinking .  Status: Continued - Date(s):  4/27/2022    Intervention(s)  Therapist will assign homework to practice coping skills  teach coping skills.    Objective #C  Client will identify whether she needs further support to reduce alcohol use.  Status: Continued  - Date(s):   4/27/2022    Intervention(s)  Therapist will provide support and referrals as needed, education on alcohol use.    Goal 2: Client will improve confidence with setting and holding boundaries as evidenced by client reporting reduce avoidance and use of three effective communication skills over the next three months.     I will know I've met my goal when I know how to talk about things.      Objective #A (Client Action)    Client will learn & utilize at least 3 assertive communication skills weekly.  Status: Continued - Date(s): 4/27/2022    Intervention(s)  Therapist will teach assertiveness skills. DBT DEAR MAN skills.    Objective #B  Client will Identify negative self-talk and behaviors: challenge core beliefs, myths, and actions.    Status: Continued - Date(s): 4/27/2022    Intervention(s)14  Therapist will guide client in exploring how core belief system influences her ability to communicate and cope with conflict.        Client has reviewed and agreed to the above plan.      SHERYL Barriga  MSW  April, 27, 2022   no

## 2022-12-07 NOTE — ED ADULT TRIAGE NOTE - CCCP TRG CHIEF CMPLNT
Called patient with results. Patient voiced understanding. Faxed results to Dr. Pedro per patient instruction.  numbness

## 2023-04-27 NOTE — ED ADULT TRIAGE NOTE - PAIN RATING/NUMBER SCALE (0-10): REST
4 Doxycycline Pregnancy And Lactation Text: This medication is Pregnancy Category D and not consider safe during pregnancy. It is also excreted in breast milk but is considered safe for shorter treatment courses.

## 2023-07-26 ENCOUNTER — INPATIENT (INPATIENT)
Facility: HOSPITAL | Age: 58
LOS: 0 days | Discharge: HOME | DRG: 282 | End: 2023-07-27
Attending: HOSPITALIST | Admitting: INTERNAL MEDICINE
Payer: COMMERCIAL

## 2023-07-26 VITALS
RESPIRATION RATE: 20 BRPM | OXYGEN SATURATION: 98 % | DIASTOLIC BLOOD PRESSURE: 75 MMHG | HEART RATE: 76 BPM | TEMPERATURE: 98 F | SYSTOLIC BLOOD PRESSURE: 120 MMHG

## 2023-07-26 DIAGNOSIS — R77.8 OTHER SPECIFIED ABNORMALITIES OF PLASMA PROTEINS: ICD-10-CM

## 2023-07-26 LAB
ALBUMIN SERPL ELPH-MCNC: 3.1 G/DL — LOW (ref 3.3–5)
ALP SERPL-CCNC: 59 U/L — SIGNIFICANT CHANGE UP (ref 40–120)
ALT FLD-CCNC: 39 U/L — SIGNIFICANT CHANGE UP (ref 12–78)
ANION GAP SERPL CALC-SCNC: 6 MMOL/L — SIGNIFICANT CHANGE UP (ref 5–17)
APTT BLD: 31.4 SEC — SIGNIFICANT CHANGE UP (ref 24.5–35.6)
AST SERPL-CCNC: 48 U/L — HIGH (ref 15–37)
BASOPHILS # BLD AUTO: 0.03 K/UL — SIGNIFICANT CHANGE UP (ref 0–0.2)
BASOPHILS NFR BLD AUTO: 0.5 % — SIGNIFICANT CHANGE UP (ref 0–2)
BILIRUB SERPL-MCNC: 0.6 MG/DL — SIGNIFICANT CHANGE UP (ref 0.2–1.2)
BUN SERPL-MCNC: 18 MG/DL — SIGNIFICANT CHANGE UP (ref 7–23)
CALCIUM SERPL-MCNC: 8.2 MG/DL — LOW (ref 8.5–10.1)
CHLORIDE SERPL-SCNC: 107 MMOL/L — SIGNIFICANT CHANGE UP (ref 96–108)
CO2 SERPL-SCNC: 24 MMOL/L — SIGNIFICANT CHANGE UP (ref 22–31)
CREAT SERPL-MCNC: 1.2 MG/DL — SIGNIFICANT CHANGE UP (ref 0.5–1.3)
EGFR: 70 ML/MIN/1.73M2 — SIGNIFICANT CHANGE UP
EOSINOPHIL # BLD AUTO: 0.15 K/UL — SIGNIFICANT CHANGE UP (ref 0–0.5)
EOSINOPHIL NFR BLD AUTO: 2.3 % — SIGNIFICANT CHANGE UP (ref 0–6)
GLUCOSE SERPL-MCNC: 162 MG/DL — HIGH (ref 70–99)
HCT VFR BLD CALC: 41.7 % — SIGNIFICANT CHANGE UP (ref 39–50)
HGB BLD-MCNC: 14.5 G/DL — SIGNIFICANT CHANGE UP (ref 13–17)
IMM GRANULOCYTES NFR BLD AUTO: 0.3 % — SIGNIFICANT CHANGE UP (ref 0–0.9)
INR BLD: 1.2 RATIO — HIGH (ref 0.85–1.18)
LYMPHOCYTES # BLD AUTO: 0.88 K/UL — LOW (ref 1–3.3)
LYMPHOCYTES # BLD AUTO: 13.6 % — SIGNIFICANT CHANGE UP (ref 13–44)
MAGNESIUM SERPL-MCNC: 2 MG/DL — SIGNIFICANT CHANGE UP (ref 1.6–2.6)
MCHC RBC-ENTMCNC: 31.6 PG — SIGNIFICANT CHANGE UP (ref 27–34)
MCHC RBC-ENTMCNC: 34.8 GM/DL — SIGNIFICANT CHANGE UP (ref 32–36)
MCV RBC AUTO: 90.8 FL — SIGNIFICANT CHANGE UP (ref 80–100)
MONOCYTES # BLD AUTO: 0.68 K/UL — SIGNIFICANT CHANGE UP (ref 0–0.9)
MONOCYTES NFR BLD AUTO: 10.5 % — SIGNIFICANT CHANGE UP (ref 2–14)
NEUTROPHILS # BLD AUTO: 4.72 K/UL — SIGNIFICANT CHANGE UP (ref 1.8–7.4)
NEUTROPHILS NFR BLD AUTO: 72.8 % — SIGNIFICANT CHANGE UP (ref 43–77)
NT-PROBNP SERPL-SCNC: 1175 PG/ML — HIGH (ref 0–125)
PLATELET # BLD AUTO: 242 K/UL — SIGNIFICANT CHANGE UP (ref 150–400)
POTASSIUM SERPL-MCNC: 3.4 MMOL/L — LOW (ref 3.5–5.3)
POTASSIUM SERPL-SCNC: 3.4 MMOL/L — LOW (ref 3.5–5.3)
PROT SERPL-MCNC: 7.2 GM/DL — SIGNIFICANT CHANGE UP (ref 6–8.3)
PROTHROM AB SERPL-ACNC: 13.5 SEC — HIGH (ref 9.5–13)
RBC # BLD: 4.59 M/UL — SIGNIFICANT CHANGE UP (ref 4.2–5.8)
RBC # FLD: 12 % — SIGNIFICANT CHANGE UP (ref 10.3–14.5)
SODIUM SERPL-SCNC: 137 MMOL/L — SIGNIFICANT CHANGE UP (ref 135–145)
TROPONIN I, HIGH SENSITIVITY RESULT: 9433.28 NG/L — HIGH
TROPONIN I, HIGH SENSITIVITY RESULT: 9552.89 NG/L — HIGH
WBC # BLD: 6.48 K/UL — SIGNIFICANT CHANGE UP (ref 3.8–10.5)
WBC # FLD AUTO: 6.48 K/UL — SIGNIFICANT CHANGE UP (ref 3.8–10.5)

## 2023-07-26 PROCEDURE — 93010 ELECTROCARDIOGRAM REPORT: CPT

## 2023-07-26 PROCEDURE — 99285 EMERGENCY DEPT VISIT HI MDM: CPT

## 2023-07-26 PROCEDURE — 80048 BASIC METABOLIC PNL TOTAL CA: CPT

## 2023-07-26 PROCEDURE — 71045 X-RAY EXAM CHEST 1 VIEW: CPT | Mod: 26

## 2023-07-26 PROCEDURE — 86803 HEPATITIS C AB TEST: CPT

## 2023-07-26 PROCEDURE — 93005 ELECTROCARDIOGRAM TRACING: CPT

## 2023-07-26 PROCEDURE — 84484 ASSAY OF TROPONIN QUANT: CPT

## 2023-07-26 PROCEDURE — 36415 COLL VENOUS BLD VENIPUNCTURE: CPT

## 2023-07-26 RX ORDER — TICAGRELOR 90 MG/1
90 TABLET ORAL EVERY 12 HOURS
Refills: 0 | Status: DISCONTINUED | OUTPATIENT
Start: 2023-07-26 | End: 2023-07-27

## 2023-07-26 RX ORDER — METOPROLOL TARTRATE 50 MG
25 TABLET ORAL ONCE
Refills: 0 | Status: COMPLETED | OUTPATIENT
Start: 2023-07-26 | End: 2023-07-26

## 2023-07-26 RX ORDER — ATORVASTATIN CALCIUM 80 MG/1
1 TABLET, FILM COATED ORAL
Refills: 0 | DISCHARGE

## 2023-07-26 RX ORDER — TICAGRELOR 90 MG/1
1 TABLET ORAL
Refills: 0 | DISCHARGE

## 2023-07-26 RX ORDER — ATORVASTATIN CALCIUM 80 MG/1
80 TABLET, FILM COATED ORAL AT BEDTIME
Refills: 0 | Status: DISCONTINUED | OUTPATIENT
Start: 2023-07-26 | End: 2023-07-27

## 2023-07-26 RX ORDER — METOPROLOL TARTRATE 50 MG
1 TABLET ORAL
Refills: 0 | DISCHARGE

## 2023-07-26 RX ORDER — ASPIRIN/CALCIUM CARB/MAGNESIUM 324 MG
1 TABLET ORAL
Qty: 0 | Refills: 0 | DISCHARGE

## 2023-07-26 RX ORDER — THYROID 120 MG
1 TABLET ORAL
Refills: 0 | DISCHARGE

## 2023-07-26 RX ORDER — POTASSIUM CHLORIDE 20 MEQ
40 PACKET (EA) ORAL ONCE
Refills: 0 | Status: COMPLETED | OUTPATIENT
Start: 2023-07-26 | End: 2023-07-26

## 2023-07-26 RX ORDER — ASPIRIN/CALCIUM CARB/MAGNESIUM 324 MG
1 TABLET ORAL
Refills: 0 | DISCHARGE

## 2023-07-26 RX ADMIN — Medication 40 MILLIEQUIVALENT(S): at 23:39

## 2023-07-26 NOTE — ED PROVIDER NOTE - OBJECTIVE STATEMENT
58 yo male wPMHx of cardiac stents, high cholesterol presents to the ED c/o midsternal chest pain x 30 min PTA. Pt had 5 stents placed last week with Dr. Morin.  Pt had a syncope episode on Saturday went to [facility] had stents placed, had a problem with one of them went to [facility] where they fixed the problem. Pt is on baby ASA, and Brilinta 90 mg 2 times a day. Pt states he was just sitting around and eating when the chest pain occurred, the episode lasted 30 min. Denies smoking.

## 2023-07-26 NOTE — ED ADULT NURSE NOTE - OBJECTIVE STATEMENT
57 year old male found laying on stretcher complaining of chest pain which has resolved.  pt has 5 stents placed last week.  during the procedure at [facility], part of the stent broke, pt was then transferred to [place] to retrieve the piece.

## 2023-07-26 NOTE — ED ADULT NURSE NOTE - CHIEF COMPLAINT QUOTE
pt ambulatory to triage c/o midsternal chest pain x30min pta. pt had 5 stents placed last week with dr. willingham. -allergies [facility] cardiac stents

## 2023-07-26 NOTE — ED ADULT NURSE NOTE - EXTENSIONS OF SELF_ADULT
Patient VSS, AO x4 tolerating liquids without complaint of nausea. Discharge education completed with patient and relative, both verbalize understanding and agree patient can safely comply with discharge plan. Medication safety completed regarding timing of next Ibuprofen administration and pain medication. Peripheral IV removed, patient dressed awaiting discharge. None

## 2023-07-26 NOTE — ED PROVIDER NOTE - CLINICAL SUMMARY MEDICAL DECISION MAKING FREE TEXT BOX
adult male comes in the ED with an episode of chest pain lasting 20 min he recently had 2 cardiac caths in the last week in a different hospital. Pt is now chest pain free now EKG shows NSR with t wave inversion in lead 2 and 3 and AVF, there is no old EKG available to compare to will attempt to contact cardiologist for an old EKG. Pt took ASA and Brilinta today. Plan to get troponin and admit to telemetry for cardiac eval.

## 2023-07-26 NOTE — ED ADULT TRIAGE NOTE - CHIEF COMPLAINT QUOTE
pt ambulatory to triage c/o midsternal chest pain x30min pta. pt had 5 stents placed last week with dr. willingham. -allergies [facility] cardiac stents

## 2023-07-26 NOTE — ED ADULT NURSE NOTE - NSFALLUNIVINTERV_ED_ALL_ED
Bed/Stretcher in lowest position, wheels locked, appropriate side rails in place/Call bell, personal items and telephone in reach/Instruct patient to call for assistance before getting out of bed/chair/stretcher/Non-slip footwear applied when patient is off stretcher/[place] to call system/Physically safe environment - no spills, clutter or unnecessary equipment/Purposeful proactive rounding/Room/bathroom lighting operational, light cord in reach

## 2023-07-27 ENCOUNTER — TRANSCRIPTION ENCOUNTER (OUTPATIENT)
Age: 58
End: 2023-07-27

## 2023-07-27 VITALS
DIASTOLIC BLOOD PRESSURE: 80 MMHG | RESPIRATION RATE: 19 BRPM | HEART RATE: 75 BPM | TEMPERATURE: 98 F | SYSTOLIC BLOOD PRESSURE: 109 MMHG | OXYGEN SATURATION: 100 %

## 2023-07-27 LAB
ANION GAP SERPL CALC-SCNC: 4 MMOL/L — LOW (ref 5–17)
BUN SERPL-MCNC: 19 MG/DL — SIGNIFICANT CHANGE UP (ref 7–23)
CALCIUM SERPL-MCNC: 8.7 MG/DL — SIGNIFICANT CHANGE UP (ref 8.5–10.1)
CHLORIDE SERPL-SCNC: 110 MMOL/L — HIGH (ref 96–108)
CO2 SERPL-SCNC: 24 MMOL/L — SIGNIFICANT CHANGE UP (ref 22–31)
CREAT SERPL-MCNC: 1.04 MG/DL — SIGNIFICANT CHANGE UP (ref 0.5–1.3)
EGFR: 83 ML/MIN/1.73M2 — SIGNIFICANT CHANGE UP
GLUCOSE SERPL-MCNC: 102 MG/DL — HIGH (ref 70–99)
HCV AB S/CO SERPL IA: 0.16 S/CO — SIGNIFICANT CHANGE UP (ref 0–0.99)
HCV AB SERPL-IMP: SIGNIFICANT CHANGE UP
POTASSIUM SERPL-MCNC: 4.2 MMOL/L — SIGNIFICANT CHANGE UP (ref 3.5–5.3)
POTASSIUM SERPL-SCNC: 4.2 MMOL/L — SIGNIFICANT CHANGE UP (ref 3.5–5.3)
SODIUM SERPL-SCNC: 138 MMOL/L — SIGNIFICANT CHANGE UP (ref 135–145)
TROPONIN I, HIGH SENSITIVITY RESULT: 7602.04 NG/L — HIGH

## 2023-07-27 PROCEDURE — 12345: CPT | Mod: NC

## 2023-07-27 PROCEDURE — 93010 ELECTROCARDIOGRAM REPORT: CPT

## 2023-07-27 PROCEDURE — 99222 1ST HOSP IP/OBS MODERATE 55: CPT

## 2023-07-27 PROCEDURE — 99497 ADVNCD CARE PLAN 30 MIN: CPT | Mod: 25

## 2023-07-27 RX ORDER — ASPIRIN/CALCIUM CARB/MAGNESIUM 324 MG
81 TABLET ORAL DAILY
Refills: 0 | Status: DISCONTINUED | OUTPATIENT
Start: 2023-07-27 | End: 2023-07-27

## 2023-07-27 RX ORDER — NITROGLYCERIN 6.5 MG
1 CAPSULE, EXTENDED RELEASE ORAL
Qty: 1 | Refills: 0
Start: 2023-07-27

## 2023-07-27 RX ORDER — ACETAMINOPHEN 500 MG
650 TABLET ORAL EVERY 6 HOURS
Refills: 0 | Status: DISCONTINUED | OUTPATIENT
Start: 2023-07-27 | End: 2023-07-27

## 2023-07-27 RX ORDER — METOPROLOL TARTRATE 50 MG
25 TABLET ORAL
Refills: 0 | Status: DISCONTINUED | OUTPATIENT
Start: 2023-07-27 | End: 2023-07-27

## 2023-07-27 RX ORDER — ONDANSETRON 8 MG/1
4 TABLET, FILM COATED ORAL EVERY 8 HOURS
Refills: 0 | Status: DISCONTINUED | OUTPATIENT
Start: 2023-07-27 | End: 2023-07-27

## 2023-07-27 RX ORDER — THYROID 120 MG
60 TABLET ORAL DAILY
Refills: 0 | Status: DISCONTINUED | OUTPATIENT
Start: 2023-07-27 | End: 2023-07-27

## 2023-07-27 RX ORDER — LANOLIN ALCOHOL/MO/W.PET/CERES
3 CREAM (GRAM) TOPICAL AT BEDTIME
Refills: 0 | Status: DISCONTINUED | OUTPATIENT
Start: 2023-07-27 | End: 2023-07-27

## 2023-07-27 RX ADMIN — Medication 60 MILLIGRAM(S): at 08:33

## 2023-07-27 RX ADMIN — TICAGRELOR 90 MILLIGRAM(S): 90 TABLET ORAL at 10:16

## 2023-07-27 RX ADMIN — Medication 81 MILLIGRAM(S): at 10:16

## 2023-07-27 RX ADMIN — Medication 25 MILLIGRAM(S): at 10:16

## 2023-07-27 NOTE — PATIENT PROFILE ADULT - STATED REASON FOR ADMISSION
Chest pain - pt had the same pain he had last week where he passed out and went to [facility] for 5x stents

## 2023-07-27 NOTE — PATIENT PROFILE ADULT - NSPROPOAURINARYCATHETER_GEN_A_NUR
[address] [facility] is following up on refill request.       Acyclovir 400 MG           Ph [phone]  Fax [phone]
Spoke to patient. She is out of town in [place] and is requesting 10 tablets of Valcyclovir be sent [facility] because she forgot her medication at home and is having an outbreak. Dr. Hodan Schulz, please advise. Last office visit 1/29/21. She knows she needs to schedule a follow up.
no

## 2023-07-27 NOTE — DISCHARGE NOTE PROVIDER - NSDCMRMEDTOKEN_GEN_ALL_CORE_FT
Ifeoma Thyroid 60 mg oral tablet: 1 tab(s) orally once a day  aspirin 81 mg oral tablet, chewable: 1 tab(s) chewed once a day  atorvastatin 80 mg oral tablet: 1 tab(s) orally once a day (at bedtime)  Brilinta (ticagrelor) 90 mg oral tablet: 1 tab(s) orally 2 times a day  metoprolol tartrate 25 mg oral tablet: 1 tab(s) orally 2 times a day  Nitrostat 0.4 mg sublingual tablet: 1 tab(s) sublingually every 3 to 5 minutes as needed for  chest pain

## 2023-07-27 NOTE — PROVIDER CONTACT NOTE (OTHER) - SITUATION
Consult called in spoke with Charbel from answering service
Faxed discharge instructions to office. - Barbara JEFFRIES

## 2023-07-27 NOTE — DISCHARGE NOTE PROVIDER - CARE PROVIDER_API CALL
Ray Danielson  Family Medicine  [address] [place] [address], [place] [address]  Phone: [phone]  Fax: [phone]  Established Patient  Follow Up Time: 1 week    Cardiologist,   Phone: (   )    -  Fax: (   )    -  Established Patient  Follow Up Time: 1 week

## 2023-07-27 NOTE — CONSULT NOTE ADULT - ASSESSMENT
56 yo Male wPMHx of CAD s/p coronary stent, high cholesterol presented to the ED with complain of  midsternal chest pain x 30 min PTA. Patient had 5 stents placed last week.  Patient  had a syncope episode on Saturday, went to [facility] had stents placed. He had a problem with one of them went to [facility] where they fixed the problem this past weekened. Patient stated he was just sitting around and eating when the chest pain occurred this time, the episode lasted about 5 minute Same . Denies smoking. (27 Jul 2023 04:29)  Spoke to his interventionilst at [facility] , RCA and LAd stents went well no complications does not think this is stent thrombosis esepecillay becuase trop peaked at [address] and is trending down .   Patient is now CP free   EKG shows inverted T waves inferiorly but no acute STT changes , repeat EKH this AM shows no changes  1) ambulate this AM if no further SXs can DC home with close f/u Dr Morin at [facility] apparwently appt has already been made   2) cont asa brillinta metropolol atorvastatin

## 2023-07-27 NOTE — H&P ADULT - NSHPREVIEWOFSYSTEMS_GEN_ALL_CORE
Gen: No fever, chills, weakness  ENT: No visual changes or throat pain  Neck: No pain or stiffness  Respiratory: No cough or wheezing  Cardiovascular: ++ chest pain, no palpitations  Gastrointestinal: No abdominal pain, nausea, vomiting, constipation, or diarrhea  Hematologic: No easy bleeding or bruising  Neurologic: No numbness or focal weakness  Psych: No depression or insomnia  Skin: No rash or itching

## 2023-07-27 NOTE — DISCHARGE NOTE NURSING/CASE MANAGEMENT/SOCIAL WORK - NSDCPEFALRISK_GEN_ALL_CORE
For information on Fall & Injury Prevention, visit: [email or web address] OR  [email or web address] OR  https://www.cdc.gov/steadi/patient.html

## 2023-07-27 NOTE — H&P ADULT - ASSESSMENT
A/P:    1.  Chest pain  Elevated troponin  CAD s/p stents  -troponin x2-elevated-most likely due to recent events including recent heart cath  -follow clinically  -no active chest pain now  -follow repeat troponin  -continue aspirin, birlinta, BB, statin  -follow cardiology consult    2.  SCD for DVT ppx    3.  Code status: Full code.

## 2023-07-27 NOTE — PATIENT PROFILE ADULT - FALL HARM RISK - HARM RISK INTERVENTIONS

## 2023-07-27 NOTE — DISCHARGE NOTE NURSING/CASE MANAGEMENT/SOCIAL WORK - PATIENT PORTAL LINK FT
You can access the FollowMyHealth Patient Portal offered by [facility] by registering at the following website: [email or web address]. By joining [employer]’s FollowMyHealth portal, you will also be able to view your health information using other applications (apps) compatible with our system.

## 2023-07-27 NOTE — CONSULT NOTE ADULT - SUBJECTIVE AND OBJECTIVE BOX
Patient is a 57y old  Male who presents with a chief complaint of Chest pain (27 Jul 2023 04:29)      HPI:  56 yo Male wPMHx of CAD s/p coronary stent, high cholesterol presented to the ED with complain of  midsternal chest pain x 30 min PTA. Patient had 5 stents placed last week.  Patient  had a syncope episode on Saturday, went to [facility] had stents placed. He had a problem with one of them went to [facility] where they fixed the problem this past weekened. Patient stated he was just sitting around and eating when the chest pain occurred this time, the episode lasted about 5 minute Same . Denies smoking. (27 Jul 2023 04:29)  Spoke to his interventionilst at [facility] , RCA and LAd stents went well no complications does not think this is stent thrombosis esepecillay becuase trop peaked at [id number] and is trending down .   Patient is now CP free       PAST MEDICAL & SURGICAL HISTORY:  High cholesterol      No significant past surgical history                                        MEDICATIONS  (STANDING):  aspirin  chewable 81 milliGRAM(s) Oral daily  atorvastatin 80 milliGRAM(s) Oral at bedtime  metoprolol tartrate 25 milliGRAM(s) Oral two times a day  thyroid 60 milliGRAM(s) Oral daily  ticagrelor 90 milliGRAM(s) Oral every 12 hours    MEDICATIONS  (PRN):  acetaminophen     Tablet .. 650 milliGRAM(s) Oral every 6 hours PRN Temp greater or equal to 38C (100.4F), Mild Pain (1 - 3)  aluminum hydroxide/magnesium hydroxide/simethicone Suspension 30 milliLiter(s) Oral every 4 hours PRN Dyspepsia  melatonin 3 milliGRAM(s) Oral at bedtime PRN Insomnia  ondansetron Injectable 4 milliGRAM(s) IV Push every 8 hours PRN Nausea and/or Vomiting      FAMILY HISTORY:  No pertinent family history in first degree relatives        SOCIAL HISTORY:    CIGARETTES:        Vital Signs Last 24 Hrs  T(C): 36.7 (27 Jul 2023 04:16), Max: 36.9 (27 Jul 2023 00:43)  T(F): 98 (27 Jul 2023 04:16), Max: 98.5 (27 Jul 2023 00:43)  HR: 66 (27 Jul 2023 04:16) (66 - 76)  BP: 112/70 (27 Jul 2023 04:16) (112/70 - 120/75)  BP(mean): 86 (27 Jul 2023 03:52) (81 - 88)  RR: 18 (27 Jul 2023 04:16) (18 - 20)  SpO2: 100% (27 Jul 2023 04:16) (98% - 100%)    Parameters below as of 27 Jul 2023 04:16  Patient On (Oxygen Delivery Method): room air                INTERPRETATION OF TELEMETRY:    ECG:    I&O's Detail      LABS:                        14.5   6.48  )-----------( 242      ( 26 Jul 2023 18:44 )             41.7     07-26    137  |  107  |  18  ----------------------------<  162<H>  3.4<L>   |  24  |  1.20    Ca    8.2<L>      26 Jul 2023 18:44  Mg     2.0     07-26    TPro  7.2  /  Alb  3.1<L>  /  TBili  0.6  /  DBili  x   /  AST  48<H>  /  ALT  39  /  AlkPhos  59  07-26        PT/INR - ( 26 Jul 2023 19:53 )   PT: 13.5 sec;   INR: 1.20 ratio         PTT - ( 26 Jul 2023 19:53 )  PTT:31.4 sec  Urinalysis Basic - ( 26 Jul 2023 18:44 )    Color: x / Appearance: x / SG: x / pH: x  Gluc: 162 mg/dL / Ketone: x  / Bili: x / Urobili: x   Blood: x / Protein: x / Nitrite: x   Leuk Esterase: x / RBC: x / WBC x   Sq Epi: x / Non Sq Epi: x / Bacteria: x      I&O's Summary    BNP  RADIOLOGY & ADDITIONAL STUDIES:

## 2023-07-27 NOTE — DISCHARGE NOTE PROVIDER - NSDCCPCAREPLAN_GEN_ALL_CORE_FT
PRINCIPAL DISCHARGE DIAGNOSIS  Diagnosis: Chronic stable angina  Assessment and Plan of Treatment: Chest pain onset could be from reactive component of arteries in the heart that were just opened up. The heart evaluation by the cardiologist and discussion with your cardiologist has deemed that your heart is currently stable. You have been precribed a medicaition (nitroglycerin) to help with the artery spasms in the heart. Take as prescribed. If it requires more than 3 doses of the medication and/or the chest pain is not alleviated by the nitroglycerin, seek further immediate medical evaluation.

## 2023-07-27 NOTE — DISCHARGE NOTE PROVIDER - NSDCCONDITION_GEN_ALL_CORE
Stable Plan: Discussed treatment options like oral antibiotics vs spironolactone vs accutane. Continue Regimen: Will continue minocycline 100 mg from twice daily to once a day for a month and then try every other day the next month. Detail Level: Zone

## 2023-07-27 NOTE — H&P ADULT - NSHPPHYSICALEXAM_GEN_ALL_CORE
T(C): 36.7 (07-27-23 @ 04:16), Max: 36.9 (07-27-23 @ 00:43)  HR: 66 (07-27-23 @ 04:16) (66 - 76)  BP: 112/70 (07-27-23 @ 04:16) (112/70 - 120/75)  RR: 18 (07-27-23 @ 04:16) (18 - 20)  SpO2: 100% (07-27-23 @ 04:16) (98% - 100%)    CONSTITUTIONAL: Well groomed, no apparent distress  EYES: PERRLA and symmetric, EOMI, No conjunctival or scleral injection, non-icteric  ENMT: Oral mucosa with moist membranes. Normal dentition; no pharyngeal injection or exudates             NECK: Supple, symmetric and without tracheal deviation   RESP: No respiratory distress, no use of accessory muscles; CTA b/l, no WRR  CV: RRR, +S1S2, no MRG; no JVD; no peripheral edema  GI: Soft, NT, ND, no rebound, no guarding; no palpable masses; no hepatosplenomegaly;  LYMPH: No cervical LAD or tenderness; no axillary LAD or tenderness; no inguinal LAD or tenderness  MSK: Normal gait; Normal ROM without pain, normal muscle strength/tone  SKIN: No rashes or ulcers noted; no subcutaneous nodules or induration palpable  NEURO: CN II-XII intact; normal reflexes in upper and lower extremities, sensation intact in upper and lower extremities b/l to light touch   PSYCH: Appropriate insight/judgment; A+O x 3, mood and affect appropriate, recent/remote memory intact

## 2023-07-27 NOTE — DISCHARGE NOTE PROVIDER - PROVIDER TOKENS
PROVIDER:[TOKEN:[[id number]],FOLLOWUP:[1 week],ESTABLISHEDPATIENT:[T]],FREE:[LAST:[Cardiologist],PHONE:[(   )    -],FAX:[(   )    -],FOLLOWUP:[1 week],ESTABLISHEDPATIENT:[T]]

## 2023-07-27 NOTE — DISCHARGE NOTE PROVIDER - HOSPITAL COURSE
FROM H&P:    "56 yo Male wPMHx of CAD s/p coronary stent, high cholesterol presented to the ED with complain of  midsternal chest pain x 30 min PTA. Patient had 5 stents placed last week.  Patient  had a syncope episode on Saturday, went to [facility] had stents placed. He had a problem with one of them went to [facility] where they fixed the problem. Patient stated he was just sitting around and eating when the chest pain occurred this time, the episode lasted more than 30 min. Denies smoking."    Admitted for further evaluation of anginal symptoms after recent LHC with stent placements less than a weeks prior to admission. Troponin elevated 9473->9552->7602. EKG with no new ischemic changed. Remained asymptomatic on and throughout admission. Cardiology consulted. They discussed with patient's cardiologist and cleared for discharge and close outpatient follow up.    Admitting/Discharge Dianosis's    Stable Angina  Elevated troponin likely residual s/p recent MI and LHC with Stent placement  HLD    T(C): 36.6 (07-27-23 @ 08:51), Max: 36.9 (07-27-23 @ 00:43)  HR: 75 (07-27-23 @ 08:51) (66 - 76)  BP: 109/80 (07-27-23 @ 08:51) (109/80 - 120/75)  RR: 19 (07-27-23 @ 08:51) (18 - 20)  SpO2: 100% (07-27-23 @ 08:51) (98% - 100%)  AAOx3; NAD  No JVD  RRR  Lungs CTA bilaterally  No edema  Discharge Management; 36 minutes  Date of Discharge/Service: 7/27/2023

## 2023-07-27 NOTE — H&P ADULT - HISTORY OF PRESENT ILLNESS
58 yo Male wPMHx of CAD s/p coronary stent, high cholesterol presented to the ED with complain of  midsternal chest pain x 30 min PTA. Patient had 5 stents placed last week.  Patient  had a syncope episode on Saturday, went to [facility] had stents placed. He had a problem with one of them went to [facility] where they fixed the problem. Patient stated he was just sitting around and eating when the chest pain occurred this time, the episode lasted more than 30 min. Denies smoking.

## 2023-08-02 DIAGNOSIS — Z79.82 LONG TERM (CURRENT) USE OF ASPIRIN: ICD-10-CM

## 2023-08-02 DIAGNOSIS — I21.4 NON-ST ELEVATION (NSTEMI) MYOCARDIAL INFARCTION: ICD-10-CM

## 2023-08-02 DIAGNOSIS — E78.00 PURE HYPERCHOLESTEROLEMIA, UNSPECIFIED: ICD-10-CM

## 2023-08-02 DIAGNOSIS — Z95.5 PRESENCE OF CORONARY ANGIOPLASTY IMPLANT AND GRAFT: ICD-10-CM

## 2023-08-02 DIAGNOSIS — I25.118 ATHEROSCLEROTIC HEART DISEASE OF NATIVE CORONARY ARTERY WITH OTHER FORMS OF ANGINA PECTORIS: ICD-10-CM

## 2024-07-24 ENCOUNTER — EMERGENCY (EMERGENCY)
Facility: HOSPITAL | Age: 59
LOS: 0 days | Discharge: HOME | End: 2024-07-25
Attending: STUDENT IN AN ORGANIZED HEALTH CARE EDUCATION/TRAINING PROGRAM
Payer: COMMERCIAL

## 2024-07-24 VITALS
RESPIRATION RATE: 18 BRPM | OXYGEN SATURATION: 98 % | SYSTOLIC BLOOD PRESSURE: 150 MMHG | DIASTOLIC BLOOD PRESSURE: 78 MMHG | HEART RATE: 79 BPM | WEIGHT: 190.48 LBS | TEMPERATURE: 97 F

## 2024-07-24 DIAGNOSIS — I82.402 ACUTE EMBOLISM AND THROMBOSIS OF UNSPECIFIED DEEP VEINS OF LEFT LOWER EXTREMITY: ICD-10-CM

## 2024-07-24 DIAGNOSIS — Z91.018 ALLERGY TO OTHER FOODS: ICD-10-CM

## 2024-07-24 DIAGNOSIS — M79.662 PAIN IN LEFT LOWER LEG: ICD-10-CM

## 2024-07-24 DIAGNOSIS — M25.562 PAIN IN LEFT KNEE: ICD-10-CM

## 2024-07-24 PROCEDURE — 93971 EXTREMITY STUDY: CPT | Mod: 26,LT

## 2024-07-24 PROCEDURE — 93971 EXTREMITY STUDY: CPT | Mod: LT

## 2024-07-24 PROCEDURE — 99284 EMERGENCY DEPT VISIT MOD MDM: CPT | Mod: 25

## 2024-07-24 PROCEDURE — 99284 EMERGENCY DEPT VISIT MOD MDM: CPT

## 2024-07-24 NOTE — ED ADULT TRIAGE NOTE - CHIEF COMPLAINT QUOTE
Pt A&OX3, presenting to the ER with c/o lower left leg pain. Pt reports this started on Saturday or Sunday. Went to urgent care yesterday where they placed him on a heavy antibiotic. Yesterday it was only at the knee and now has spread down to the calf.   Denies fevers, numbness or tingling.   Swelling noted to the left knee down to the ankle.

## 2024-07-24 NOTE — ED STATDOCS - NSFOLLOWUPINSTRUCTIONS_ED_ALL_ED_FT
ELIQUIS STARTED PACK sent to your pharmacy. TAKE AS DIRECTED ON PACKAGE.     Seek immediate medical assistance for any new or worsening symptoms. If you have issues obtaining follow up, please call: [phone] or [phone]  to obtain a doctor or specialist who takes your insurance in your area.     Deep Vein Thrombosis  A person's legs with close-ups showing a normal vein, a vein with a blood clot, and a blood clot that breaks loose.  Deep vein thrombosis (DVT) is a condition in which a blood clot forms in a vein of the deep venous system. This can occur in the lower leg, thigh, pelvis, arm, or neck. A clot is blood that has thickened into a gel or solid. This condition is serious and can be life-threatening if the clot travels to the arteries of the lungs and causes a blockage (pulmonary embolism). A DVT can also damage veins in the leg, which can lead to long-term venous disease, leg pain, swelling, discoloration, and ulcers or sores (post-thrombotic syndrome).    What are the causes?  This condition may be caused by:  A slowdown of blood flow.  Damage to a vein.  A condition that causes blood to clot more easily, such as certain bleeding disorders.  What increases the risk?  The following factors may make you more likely to develop this condition:  Obesity.  Being older, especially older than age 60.  Being inactive or not moving around (sedentary lifestyle). This may include:  Sitting or lying down for longer than 4–6 hours other than to sleep at night.  Being in the hospital, or having major or lengthy surgery.  Having any recent bone injuries, such as breaks (fractures), that reduce movement, especially in the lower extremities.  Having recent orthopedic surgery on the lower extremities.  Being pregnant, giving birth, or having recently given birth.  Taking medicines that contain estrogen, such as birth control or hormone replacement therapy.  Using products that contain nicotine or tobacco, especially if you use hormonal birth control.  Having a history of a blood vessel disease (peripheral vascular disease) or congestive heart disease.  Having a history of cancer, especially if being treated with chemotherapy.  What are the signs or symptoms?  Symptoms of this condition include:  Swelling, pain, pressure, or tenderness in an arm or a leg.  An arm or a leg becoming warm, red, or discolored.  A leg turning very pale or blue. You may have a large DVT. This is rare.  If the clot is in your leg, you may notice that symptoms get worse when you stand or walk.    In some cases, there are no symptoms.    How is this diagnosed?  This condition is diagnosed with:  Your medical history and a physical exam.  Tests, such as:  Blood tests to check how well your blood clots.  Doppler ultrasound. This is the best way to find a DVT.  CT venogram. Contrast dye is injected into a vein, and X-rays are taken to check for clots. This is helpful for veins in the chest or pelvis.  How is this treated?  Treatment for this condition depends on:  The cause of your DVT.  The size and location of your DVT, or having more than one DVT.  Your risk for bleeding or developing more clots.  Other medical conditions you may have.  Treatment may include:  Taking a blood thinner medicine (anticoagulant) to prevent more clots from forming or current clots from growing.  Wearing compression stockings.  Injecting medicines into the affected vein to break up the clot (catheter-directed thrombolysis).  Surgical procedures, when DVT is severe or hard to treat. These may be done to:  Isolate and remove your clot.  Place an inferior vena cava (IVC) filter. This filter is placed into a large vein called the inferior vena cava to catch blood clots before they reach your lungs.  You may get some medical treatments for 6 months or longer.    Follow these instructions at home:  If you are taking blood thinners:    Talk with your health care provider before you take any medicines that contain aspirin or NSAIDs, such as ibuprofen. These medicines increase your risk for dangerous bleeding.  Take your medicine exactly as told, at the same time every day. Do not skip a dose. Do not take more than the prescribed dose. This is important.  Ask your health care provider about foods and medicines that could change or interact with the way your blood thinner works. Avoid these foods and medicines if you are told to do so.  Avoid anything that may cause bleeding or bruising. You may bleed more easily while taking blood thinners.  Be very careful when using knives, scissors, or other sharp objects.  Use an electric razor instead of a blade.  Avoid activities that could cause injury or bruising, and follow instructions for preventing falls.  Tell your health care provider if you have had any internal bleeding, bleeding ulcers, or neurologic diseases, such as strokes or cerebral aneurysms.  Wear a medical alert bracelet or carry a card that lists what medicines you take.  General instructions    Take over-the-counter and prescription medicines only as told by your health care provider.  Return to your normal activities as told by your health care provider. Ask your health care provider what activities are safe for you.  If recommended, wear compression stockings as told by your health care provider. These stockings help to prevent blood clots and reduce swelling in your legs. Never wear your compression stockings while sleeping at night.  Keep all follow-up visits. This is important.  Where to find more information  American Heart Association: www.heart.org  Centers for Disease Control and Prevention: www.cdc.gov  National Heart, Lung, and Blood [place]: www.nhlbi.nih.gov  Contact a health care provider if:  You miss a dose of your blood thinner.  You have unusual bruising or other color changes.  You have new or worse pain, swelling, or redness in an arm or a leg.  You have worsening numbness or tingling in an arm or a leg.  You have a significant color change (pale or blue) in the extremity that has the DVT.  Get help right away if:  You have signs or symptoms that a blood clot has moved to the lungs. These may include:  Shortness of breath.  Chest pain.  Fast or irregular heartbeats (palpitations).  Light-headedness, dizziness, or fainting.  Coughing up blood.  You have signs or symptoms that your blood is too thin. These may include:  Blood in your vomit, stool, or urine.  A cut that will not stop bleeding.  A menstrual period that is heavier than usual.  A severe headache or confusion.  These symptoms may be an emergency. Get help right away. Call 911.  Do not wait to see if the symptoms will go away.  Do not drive yourself to the hospital.  Summary  Deep vein thrombosis (DVT) happens when a blood clot forms in a deep vein. This may occur in the lower leg, thigh, pelvis, arm, or neck.  Symptoms affect the arm or leg and can include swelling, pain, tenderness, warmth, redness, or discoloration.  This condition may be treated with medicines. In severe cases, a procedure or surgery may be done to remove or dissolve the clots.  If you are taking blood thinners, take them exactly as told. Do not skip a dose. Do not take more than is prescribed.  Get help right away if you have a severe headache, shortness of breath, chest pain, fast or irregular heartbeats, or blood in your vomit, urine, or stool.  This information is not intended to replace advice given to you by your health care provider. Make sure you discuss any questions you have with your health care provider.    Document Revised: 07/11/2022 Document Reviewed: 07/11/2022  [employer] Patient Education © 2024 [employer]  [employer] logo  Terms and Conditions  Privacy Policy  Editorial Policy  All content on this site: Copyright © 2024 Elsevier, its licensors, and contributors. All rights are reserved, including those for text and data mining, AI training, and similar technologies. For all open access content, the Creative Commons licensing terms apply.  Cookies are used by this site. To decline or learn more, visit our Cookies page.

## 2024-07-24 NOTE — ED STATDOCS - MUSCULOSKELETAL, MLM
left lower extremity is diffusely swollen no palpable cord positive pitting edema there is tenderness to the prepatellar bursa of the left knee.  He is able to fully range the knee there is soft compartments no crepitus.  No patellar dislocation no obvious deformity.

## 2024-07-24 NOTE — ED STATDOCS - CLINICAL SUMMARY MEDICAL DECISION MAKING FREE TEXT BOX
Evaluation for right knee pain.  Exam consistent with prepatellar bursitis.  Given the amount of swelling will rule out DVT with an ultrasound.  He is already on antibiotics and is over the follow-up.

## 2024-07-24 NOTE — ED STATDOCS - OBJECTIVE STATEMENT
58-year-old male presents to the ED with pain and swelling to the left knee.  He said he had some pain at the patellar tendon for several days and then he hit the quads tendon and then it flared up the entire knee.  He went to urgent care who started him on Keflex.  Patient's been taking the Keflex and reports the pain decreased however now swelling is feeling up his calf and his ankle.  No history of DVT.  No recent travel or surgery or immobilization.  He works with [employer].  No significant medical history.  He has an appoint with an orthopedic this week.

## 2024-07-24 NOTE — ED STATDOCS - PATIENT PORTAL LINK FT
You can access the FollowMyHealth Patient Portal offered by [facility] by registering at the following website: [email or web address]. By joining [facility]’s FollowMyHealth portal, you will also be able to view your health information using other applications (apps) compatible with our system.

## 2024-07-25 VITALS
TEMPERATURE: 98 F | RESPIRATION RATE: 18 BRPM | DIASTOLIC BLOOD PRESSURE: 85 MMHG | OXYGEN SATURATION: 97 % | HEART RATE: 65 BPM | SYSTOLIC BLOOD PRESSURE: 129 MMHG

## 2024-07-25 PROBLEM — E78.00 PURE HYPERCHOLESTEROLEMIA, UNSPECIFIED: Chronic | Status: ACTIVE | Noted: 2023-07-26

## 2024-07-25 RX ORDER — APIXABAN 5 MG/1
10 TABLET, FILM COATED ORAL ONCE
Refills: 0 | Status: COMPLETED | OUTPATIENT
Start: 2024-07-25 | End: 2024-07-25

## 2024-07-25 RX ORDER — APIXABAN 5 MG/1
1 TABLET, FILM COATED ORAL
Qty: 1 | Refills: 1
Start: 2024-07-25 | End: 2024-07-26

## 2024-07-25 RX ORDER — RIVAROXABAN 10 MG/1
1 TABLET, FILM COATED ORAL
Qty: 1 | Refills: 0
Start: 2024-07-25 | End: 2024-07-25

## 2024-07-25 RX ADMIN — APIXABAN 10 MILLIGRAM(S): 5 TABLET, FILM COATED ORAL at 00:59

## 2024-07-25 NOTE — ED ADULT NURSE NOTE - OBJECTIVE STATEMENT
Pt presents to the ED with c/o  LLE pain. Pt endorses pain started on Saturday or Sunday. Went to  Wednesday, d/c'd with PO abx keflex. Pain began in L knee, now spreading down calf. Denies fevers/chills, injury/trauma, numbness or tingling. L knee/calf appears swollen, no redness noted.

## 2024-08-25 NOTE — ED STATDOCS - NSTIMEPROVIDERCAREINITIATE_GEN_ER
Quarantine for 5-7 days  OTC meds for symptomatic relief  Offered Paxlovid. Discussed Risks and benefits. Desires to take it. I will call when I get labs back.  Drink plenty of fluids  Go to ER with any difficulty breathing or worsening  Return to clinic as needed   
08-Sep-2020 13:45

## 2025-06-12 NOTE — PATIENT PROFILE ADULT. - MENTAL HEALTH CONDITIONS/SYMPTOMS, PROFILE
Rx Refill Note  Requested Prescriptions     Pending Prescriptions Disp Refills    losartan (COZAAR) 25 MG tablet [Pharmacy Med Name: Losartan Potassium 25 MG Oral Tablet] 90 tablet 0     Sig: Take 1 tablet by mouth once daily      Last office visit with prescribing clinician: 8/23/2024   Last telemedicine visit with prescribing clinician: Visit date not found   Next office visit with prescribing clinician: Visit date not found                            none